# Patient Record
Sex: FEMALE | HISPANIC OR LATINO | Employment: FULL TIME | URBAN - METROPOLITAN AREA
[De-identification: names, ages, dates, MRNs, and addresses within clinical notes are randomized per-mention and may not be internally consistent; named-entity substitution may affect disease eponyms.]

---

## 2019-04-01 ENCOUNTER — OFFICE VISIT (OUTPATIENT)
Dept: CARDIOLOGY | Facility: CLINIC | Age: 59
End: 2019-04-01
Payer: COMMERCIAL

## 2019-04-01 VITALS
HEIGHT: 66 IN | WEIGHT: 209 LBS | SYSTOLIC BLOOD PRESSURE: 140 MMHG | BODY MASS INDEX: 33.59 KG/M2 | HEART RATE: 63 BPM | DIASTOLIC BLOOD PRESSURE: 87 MMHG

## 2019-04-01 DIAGNOSIS — R07.89 OTHER CHEST PAIN: ICD-10-CM

## 2019-04-01 DIAGNOSIS — R00.2 PALPITATIONS: Primary | ICD-10-CM

## 2019-04-01 PROCEDURE — 99204 PR OFFICE/OUTPT VISIT, NEW, LEVL IV, 45-59 MIN: ICD-10-PCS | Mod: S$GLB,,, | Performed by: INTERNAL MEDICINE

## 2019-04-01 PROCEDURE — 3008F BODY MASS INDEX DOCD: CPT | Mod: CPTII,S$GLB,, | Performed by: INTERNAL MEDICINE

## 2019-04-01 PROCEDURE — 99999 PR PBB SHADOW E&M-NEW PATIENT-LVL II: CPT | Mod: PBBFAC,,, | Performed by: INTERNAL MEDICINE

## 2019-04-01 PROCEDURE — 93005 EKG 12-LEAD: ICD-10-PCS | Mod: S$GLB,,, | Performed by: NUCLEAR MEDICINE

## 2019-04-01 PROCEDURE — 93010 ELECTROCARDIOGRAM REPORT: CPT | Mod: S$GLB,,, | Performed by: NUCLEAR MEDICINE

## 2019-04-01 PROCEDURE — 93010 EKG 12-LEAD: ICD-10-PCS | Mod: S$GLB,,, | Performed by: NUCLEAR MEDICINE

## 2019-04-01 PROCEDURE — 99204 OFFICE O/P NEW MOD 45 MIN: CPT | Mod: S$GLB,,, | Performed by: INTERNAL MEDICINE

## 2019-04-01 PROCEDURE — 3008F PR BODY MASS INDEX (BMI) DOCUMENTED: ICD-10-PCS | Mod: CPTII,S$GLB,, | Performed by: INTERNAL MEDICINE

## 2019-04-01 PROCEDURE — 93005 ELECTROCARDIOGRAM TRACING: CPT | Mod: S$GLB,,, | Performed by: NUCLEAR MEDICINE

## 2019-04-01 PROCEDURE — 99999 PR PBB SHADOW E&M-NEW PATIENT-LVL II: ICD-10-PCS | Mod: PBBFAC,,, | Performed by: INTERNAL MEDICINE

## 2019-04-01 RX ORDER — PRAVASTATIN SODIUM 20 MG/1
TABLET ORAL
COMMUNITY
Start: 2019-03-08 | End: 2020-05-26 | Stop reason: SDUPTHER

## 2019-04-01 RX ORDER — LOTEPREDNOL ETABONATE 5 MG/ML
SUSPENSION/ DROPS OPHTHALMIC
COMMUNITY
End: 2019-05-02

## 2019-04-01 RX ORDER — CYCLOSPORINE 0.5 MG/ML
EMULSION OPHTHALMIC
COMMUNITY
Start: 2019-03-14 | End: 2024-03-26 | Stop reason: ALTCHOICE

## 2019-04-01 NOTE — PROGRESS NOTES
Subjective:    Patient ID:  Zahida Dodd is a 59 y.o. female who presents for evaluation of new pt (new pt- CP, arm pain ) and Shortness of Breath      [id number] yo  woman who has mild HLD who a week ago had abdominal pain that eventually went up into her chest after eating. Later that night she awoke with CP radiating to arm and took several ASA that resolved the symptoms. Denies exertional symptoms.    Review of Systems   Constitution: Negative for decreased appetite, fever, malaise/fatigue, weight gain and weight loss.   HENT: Negative for hearing loss and nosebleeds.    Eyes: Negative for visual disturbance.   Cardiovascular: Positive for chest pain. Negative for claudication, cyanosis, dyspnea on exertion, irregular heartbeat, leg swelling, near-syncope, orthopnea, palpitations, paroxysmal nocturnal dyspnea and syncope.   Respiratory: Negative for cough, hemoptysis, shortness of breath, sleep disturbances due to breathing, snoring and wheezing.    Endocrine: Negative for cold intolerance, heat intolerance, polydipsia and polyuria.   Hematologic/Lymphatic: Negative for adenopathy and bleeding problem. Does not bruise/bleed easily.   Skin: Negative for color change, itching, poor wound healing, rash and suspicious lesions.   Musculoskeletal: Negative for arthritis, back pain, falls, joint pain, joint swelling, muscle cramps, muscle weakness and myalgias.   Gastrointestinal: Positive for heartburn. Negative for bloating, abdominal pain, change in bowel habit, constipation, flatus, hematemesis, hematochezia, hemorrhoids, jaundice, melena, nausea and vomiting.   Genitourinary: Negative for bladder incontinence, decreased libido, frequency, hematuria, hesitancy and urgency.   Neurological: Negative for brief paralysis, difficulty with concentration, excessive daytime sleepiness, dizziness, focal weakness, headaches, light-headedness, loss of balance, numbness, vertigo and weakness.  "  Psychiatric/Behavioral: Negative for altered mental status, depression and memory loss. The patient does not have insomnia and is not nervous/anxious.    Allergic/Immunologic: Negative for environmental allergies, hives and persistent infections.        Objective:    Physical Exam   Constitutional: She is oriented to person, place, and time. She appears well-developed and well-nourished.   BP (!) 140/87   Pulse 63   Ht 5' 6" (1.676 m)   Wt 94.8 kg (209 lb)   BMI 33.73 kg/m²      HENT:   Head: Normocephalic and atraumatic.   Right Ear: External ear normal.   Left Ear: External ear normal.   Nose: Nose normal.   Mouth/Throat: Oropharynx is clear and moist.   Eyes: Pupils are equal, round, and reactive to light. Conjunctivae, EOM and lids are normal. Right eye exhibits no discharge. Left eye exhibits no discharge. Right conjunctiva has no hemorrhage. No scleral icterus.   Neck: Normal range of motion. Neck supple. No JVD present. No tracheal deviation present. No thyromegaly present.   Cardiovascular: Normal rate, regular rhythm, normal heart sounds and intact distal pulses. Exam reveals no gallop and no friction rub.   No murmur heard.  Pulmonary/Chest: Effort normal and breath sounds normal. No respiratory distress. She has no wheezes. She has no rales. She exhibits no tenderness. Breasts are symmetrical.   Abdominal: Soft. Bowel sounds are normal. She exhibits no distension and no mass. There is no hepatosplenomegaly or hepatomegaly. There is no tenderness. There is no rebound and no guarding.   Musculoskeletal: Normal range of motion. She exhibits no edema or tenderness.   Lymphadenopathy:     She has no cervical adenopathy.   Neurological: She is alert and oriented to person, place, and time. She displays normal reflexes. No cranial nerve deficit. Coordination normal.   Skin: Skin is warm and dry. No rash noted. No erythema. No pallor.   Psychiatric: She has a normal mood and affect. Her behavior is normal. " Judgment and thought content normal.   Nursing note and vitals reviewed.        Assessment:       1. Palpitations    2. Other chest pain         Plan:    Symptoms are not typical but she is in the right age group and has HLD      Orders Placed This Encounter   Procedures    SCHEDULED EKG 12-LEAD (to Muse)    Exercise stress echo     Follow up if symptoms worsen or fail to improve, for Will call with results.

## 2019-05-02 ENCOUNTER — HOSPITAL ENCOUNTER (OUTPATIENT)
Dept: RADIOLOGY | Facility: HOSPITAL | Age: 59
Discharge: HOME | End: 2019-05-02
Attending: OBSTETRICS & GYNECOLOGY
Payer: COMMERCIAL

## 2019-05-02 ENCOUNTER — OFFICE VISIT (OUTPATIENT)
Dept: OBSTETRICS AND GYNECOLOGY | Facility: CLINIC | Age: 59
End: 2019-05-02
Payer: COMMERCIAL

## 2019-05-02 VITALS
WEIGHT: 210.75 LBS | HEIGHT: 66 IN | DIASTOLIC BLOOD PRESSURE: 84 MMHG | BODY MASS INDEX: 33.87 KG/M2 | SYSTOLIC BLOOD PRESSURE: 120 MMHG

## 2019-05-02 VITALS — HEIGHT: 66 IN | BODY MASS INDEX: 33.75 KG/M2 | WEIGHT: 210 LBS

## 2019-05-02 DIAGNOSIS — Z12.31 VISIT FOR SCREENING MAMMOGRAM: ICD-10-CM

## 2019-05-02 DIAGNOSIS — Z01.419 ENCOUNTER FOR GYNECOLOGICAL EXAMINATION WITHOUT ABNORMAL FINDING: Primary | ICD-10-CM

## 2019-05-02 DIAGNOSIS — Z90.711 S/P PARTIAL HYSTERECTOMY WITH REMAINING CERVICAL STUMP: ICD-10-CM

## 2019-05-02 DIAGNOSIS — G47.00 INSOMNIA, UNSPECIFIED TYPE: ICD-10-CM

## 2019-05-02 DIAGNOSIS — N89.8 VAGINAL DRYNESS: ICD-10-CM

## 2019-05-02 PROCEDURE — 87624 HPV HI-RISK TYP POOLED RSLT: CPT

## 2019-05-02 PROCEDURE — 99386 PR PREVENTIVE VISIT,NEW,40-64: ICD-10-PCS | Mod: S$GLB,,, | Performed by: OBSTETRICS & GYNECOLOGY

## 2019-05-02 PROCEDURE — 77067 SCR MAMMO BI INCL CAD: CPT | Mod: TC,PN

## 2019-05-02 PROCEDURE — 99999 PR PBB SHADOW E&M-EST. PATIENT-LVL III: ICD-10-PCS | Mod: PBBFAC,,, | Performed by: OBSTETRICS & GYNECOLOGY

## 2019-05-02 PROCEDURE — 99386 PREV VISIT NEW AGE 40-64: CPT | Mod: S$GLB,,, | Performed by: OBSTETRICS & GYNECOLOGY

## 2019-05-02 PROCEDURE — 77063 MAMMO DIGITAL SCREENING BILAT WITH TOMOSYNTHESIS_CAD: ICD-10-PCS | Mod: 26,,, | Performed by: RADIOLOGY

## 2019-05-02 PROCEDURE — 99999 PR PBB SHADOW E&M-EST. PATIENT-LVL III: CPT | Mod: PBBFAC,,, | Performed by: OBSTETRICS & GYNECOLOGY

## 2019-05-02 PROCEDURE — 77067 SCR MAMMO BI INCL CAD: CPT | Mod: 26,,, | Performed by: RADIOLOGY

## 2019-05-02 PROCEDURE — 77063 BREAST TOMOSYNTHESIS BI: CPT | Mod: 26,,, | Performed by: RADIOLOGY

## 2019-05-02 PROCEDURE — 88175 CYTOPATH C/V AUTO FLUID REDO: CPT

## 2019-05-02 PROCEDURE — 77067 MAMMO DIGITAL SCREENING BILAT WITH TOMOSYNTHESIS_CAD: ICD-10-PCS | Mod: 26,,, | Performed by: RADIOLOGY

## 2019-05-02 RX ORDER — PROGESTERONE 100 MG/1
100 CAPSULE ORAL NIGHTLY
Qty: 30 CAPSULE | Refills: 11 | Status: SHIPPED | OUTPATIENT
Start: 2019-05-02 | End: 2019-06-19 | Stop reason: SDUPTHER

## 2019-05-02 RX ORDER — ESTRADIOL 10 UG/1
10 INSERT VAGINAL
Qty: 8 TABLET | Refills: 11 | Status: SHIPPED | OUTPATIENT
Start: 2019-05-02 | End: 2019-06-19 | Stop reason: SDUPTHER

## 2019-05-02 NOTE — PROGRESS NOTES
Chief Complaint   Patient presents with    [facility]    Well Woman    Mammogram     History of Present Illness: Zahida Dodd is a 59 y.o. female that presents today 5/2/2019 for well gyn visit. She reports having some bleeding after intercourse 3 years ago and not having intercourse since then.     Past Medical History:   Diagnosis Date    Arthritis     Hyperlipidemia        Past Surgical History:   Procedure Laterality Date    COLONOSCOPY  2014    repeat in 10    HYSTERECTOMY  2004    cervix present, on HRT for 7 years age 42-49    OOPHORECTOMY      REDUCTION OF BOTH BREASTS         Current Outpatient Medications   Medication Sig Dispense Refill    pravastatin (PRAVACHOL) 20 MG tablet       RESTASIS 0.05 % ophthalmic emulsion        No current facility-administered medications for this visit.        Review of patient's allergies indicates:   Allergen Reactions    Penicillins Hives       Family History   Problem Relation Age of Onset    Breast cancer Neg Hx     Ovarian cancer Neg Hx        Social History     Socioeconomic History    Marital status:      Spouse name: Not on file    Number of children: Not on file    Years of education: Not on file    Highest education level: Not on file   Occupational History    Not on file   Social Needs    Financial resource strain: Not on file    Food insecurity:     Worry: Not on file     Inability: Not on file    Transportation needs:     Medical: Not on file     Non-medical: Not on file   Tobacco Use    Smoking status: Never Smoker   Substance and Sexual Activity    Alcohol use: Yes     Comment: rare    Drug use: Never    Sexual activity: Not on file   Lifestyle    Physical activity:     Days per week: Not on file     Minutes per session: Not on file    Stress: Not on file   Relationships    Social connections:     Talks on phone: Not on file     Gets together: Not on file     Attends Judaism service: Not on file     Active member of  "club or organization: Not on file     Attends meetings of clubs or organizations: Not on file     Relationship status: Not on file   Other Topics Concern    Not on file   Social History Narrative    Not on file       OB History    Para Term  AB Living   3 3 3         SAB TAB Ectopic Multiple Live Births                  # Outcome Date GA Lbr Waqas/2nd Weight Sex Delivery Anes PTL Lv   3 Term      CS-LTranv      2 Term      CS-LTranv      1 Term      CS-LTranv          Review of Symptoms:  GENERAL: Denies weight gain or weight loss. Feeling well overall.   SKIN: Denies rash or lesions.   HEAD: Denies head injury or headache.   NODES: Denies enlarged lymph nodes.   CHEST: Denies chest pain or shortness of breath.   CARDIOVASCULAR: Denies palpitations or left sided chest pain.   ABDOMEN: No abdominal pain, constipation, diarrhea, nausea, vomiting or rectal bleeding.   URINARY: No frequency, dysuria, hematuria, or burning on urination.  HEMATOLOGIC: No easy bruisability or excessive bleeding.   MUSCULOSKELETAL: Denies joint pain or swelling.     /84   Ht 5' 6" (1.676 m)   Wt 95.6 kg (210 lb 12.2 oz)   Physical Exam:  APPEARANCE: Well nourished, well developed, in no acute distress.  SKIN: Normal skin turgor, no lesions.  NECK: Neck symmetric without masses   RESPIRATORY: Normal respiratory effort with no retractions or use of accessory muscles  CARDIOVASCULAR: Peripheral vascular system with no swelling no varicosities and palpation of pulses normal  LYMPHATIC: No enlargements of the lymph nodes noted in the neck, axillae, or groin  ABDOMEN: Soft. No tenderness or masses. No hepatosplenomegaly. No hernias.  BREASTS: Symmetrical, no skin changes or visible lesions. No palpable masses, nipple discharge or adenopathy bilaterally.  PELVIC: Normal external female genitalia without lesions. Normal hair distribution. Adequate perineal body, normal urethral meatus. Urethra with no masses.  Bladder " nontender. Vagina moist and well rugated without lesions or discharge. No significant cystocele or rectocele.  Adnexa without masses or tenderness. Urethra and bladder normal. Cervix present +++  EXTREMITIES: No clubbing cyanosis or edema.    ASSESSMENT/PLAN:  Encounter for gynecological examination without abnormal finding    Visit for screening mammogram  -     Cancel: Mammo Digital Screening Bilat With CAD; Future; Expected date: 05/02/2019    S/p partial hysterectomy with remaining cervical stump          Patient was counseled today on Pap guidelines. We discussed the discontinuing the pap smear after hysterectomy except in certain high risk cases.  We discussed the need for pelvic exams.   We discussed STD screening if at high risk for an STD.  We discussed breast cancer screening with mammograms every other year after the age of 40 and annually after the age of 50.    We discussed colon cancer screening.   Osteoporosis screening with the Dexa Bone Scan discussed when indicated.   She will see her PCP for other health maintenance.       FOLLOW-UP:prn

## 2019-05-08 LAB
HPV HR 12 DNA CVX QL NAA+PROBE: NEGATIVE
HPV16 AG SPEC QL: NEGATIVE
HPV18 DNA SPEC QL NAA+PROBE: NEGATIVE

## 2019-06-05 ENCOUNTER — OFFICE VISIT (OUTPATIENT)
Dept: FAMILY MEDICINE | Facility: CLINIC | Age: 59
End: 2019-06-05
Payer: COMMERCIAL

## 2019-06-05 ENCOUNTER — HOSPITAL ENCOUNTER (OUTPATIENT)
Dept: RADIOLOGY | Facility: HOSPITAL | Age: 59
Discharge: HOME | End: 2019-06-05
Attending: FAMILY MEDICINE
Payer: COMMERCIAL

## 2019-06-05 VITALS
OXYGEN SATURATION: 95 % | HEART RATE: 63 BPM | BODY MASS INDEX: 34.19 KG/M2 | HEIGHT: 66 IN | SYSTOLIC BLOOD PRESSURE: 116 MMHG | WEIGHT: 212.75 LBS | DIASTOLIC BLOOD PRESSURE: 78 MMHG

## 2019-06-05 DIAGNOSIS — M54.2 NECK PAIN: ICD-10-CM

## 2019-06-05 DIAGNOSIS — R20.2 TINGLING: ICD-10-CM

## 2019-06-05 DIAGNOSIS — F43.9 STRESS: ICD-10-CM

## 2019-06-05 DIAGNOSIS — G89.29 CHRONIC PAIN OF RIGHT KNEE: ICD-10-CM

## 2019-06-05 DIAGNOSIS — R53.83 OTHER FATIGUE: ICD-10-CM

## 2019-06-05 DIAGNOSIS — Z11.59 NEED FOR HEPATITIS C SCREENING TEST: ICD-10-CM

## 2019-06-05 DIAGNOSIS — E78.49 OTHER HYPERLIPIDEMIA: ICD-10-CM

## 2019-06-05 DIAGNOSIS — N39.498 OTHER URINARY INCONTINENCE: ICD-10-CM

## 2019-06-05 DIAGNOSIS — E66.09 CLASS 1 OBESITY DUE TO EXCESS CALORIES WITHOUT SERIOUS COMORBIDITY WITH BODY MASS INDEX (BMI) OF 34.0 TO 34.9 IN ADULT: ICD-10-CM

## 2019-06-05 DIAGNOSIS — M54.9 UPPER BACK PAIN: ICD-10-CM

## 2019-06-05 DIAGNOSIS — M25.561 CHRONIC PAIN OF RIGHT KNEE: ICD-10-CM

## 2019-06-05 DIAGNOSIS — M54.9 UPPER BACK PAIN: Primary | ICD-10-CM

## 2019-06-05 PROBLEM — E66.811 CLASS 1 OBESITY DUE TO EXCESS CALORIES WITHOUT SERIOUS COMORBIDITY WITH BODY MASS INDEX (BMI) OF 34.0 TO 34.9 IN ADULT: Status: ACTIVE | Noted: 2019-06-05

## 2019-06-05 PROBLEM — R32 ABSENCE OF BLADDER CONTINENCE: Status: ACTIVE | Noted: 2019-06-05

## 2019-06-05 PROBLEM — M25.562 CHRONIC PAIN OF BOTH KNEES: Status: ACTIVE | Noted: 2019-06-05

## 2019-06-05 PROCEDURE — 99999 PR PBB SHADOW E&M-EST. PATIENT-LVL IV: CPT | Mod: PBBFAC,,, | Performed by: FAMILY MEDICINE

## 2019-06-05 PROCEDURE — 99204 OFFICE O/P NEW MOD 45 MIN: CPT | Mod: S$GLB,,, | Performed by: FAMILY MEDICINE

## 2019-06-05 PROCEDURE — 72040 X-RAY EXAM NECK SPINE 2-3 VW: CPT | Mod: 26,,, | Performed by: RADIOLOGY

## 2019-06-05 PROCEDURE — 72040 X-RAY EXAM NECK SPINE 2-3 VW: CPT | Mod: TC,FY,PO

## 2019-06-05 PROCEDURE — 3008F BODY MASS INDEX DOCD: CPT | Mod: CPTII,S$GLB,, | Performed by: FAMILY MEDICINE

## 2019-06-05 PROCEDURE — 99999 PR PBB SHADOW E&M-EST. PATIENT-LVL IV: ICD-10-PCS | Mod: PBBFAC,,, | Performed by: FAMILY MEDICINE

## 2019-06-05 PROCEDURE — 99204 PR OFFICE/OUTPT VISIT, NEW, LEVL IV, 45-59 MIN: ICD-10-PCS | Mod: S$GLB,,, | Performed by: FAMILY MEDICINE

## 2019-06-05 PROCEDURE — 72040 XR CERVICAL SPINE AP LATERAL: ICD-10-PCS | Mod: 26,,, | Performed by: RADIOLOGY

## 2019-06-05 PROCEDURE — 3008F PR BODY MASS INDEX (BMI) DOCUMENTED: ICD-10-PCS | Mod: CPTII,S$GLB,, | Performed by: FAMILY MEDICINE

## 2019-06-05 RX ORDER — MELOXICAM 15 MG/1
15 TABLET ORAL DAILY
COMMUNITY
End: 2019-12-09

## 2019-06-05 NOTE — PATIENT INSTRUCTIONS

## 2019-06-10 ENCOUNTER — TELEPHONE (OUTPATIENT)
Dept: FAMILY MEDICINE | Facility: CLINIC | Age: 59
End: 2019-06-10

## 2019-06-10 NOTE — TELEPHONE ENCOUNTER
"I sent a message to [employer]:    "The results of the test showed that the B12 levels are slightly elevated, if you taking vitamin B12 or B complex or multivitamins that contained b12, you can start taking it every other day instead of daily.  Cholesterol levels are slightly elevated, decrease fried foods, red meat and processed starches, eat more vegetables, more fruits, more salads, drink plenty water.  Uric acid levels are in the limit, but because is under 6, we do not have to treat it.  Thyroid function and blood count is good.  If you have any questions or concerns please do not hesitate to contact us.  Thank you".    X rays:  "The results of the test showed presence of arthritis on the neck area, because of the numbness sensation and to rule out any nerve problems, I will order a test to check for nerve damage, the test is call EMG studies, electromyography studies, the nurse will call you with appointment.  With that will make sure that is not any pinched nerve.  If you have any questions or concerns please do not hesitate to contact us.  Thank you"    Thank you       "

## 2019-06-10 NOTE — TELEPHONE ENCOUNTER
----- Message from Luba Burgos sent at 6/10/2019  2:23 PM CDT -----  Contact: patient  Type:  Test Results    Who Called:  patient  Name of Test (Lab/Mammo/Etc):  Lab and xray  Date of Test:  0605/2019  Ordering Provider:  Jessica  Where the test was performed:  [facility]  Best Call Back Number:  [phone]  Additional Information:

## 2019-06-11 ENCOUNTER — TELEPHONE (OUTPATIENT)
Dept: FAMILY MEDICINE | Facility: CLINIC | Age: 59
End: 2019-06-11

## 2019-06-11 NOTE — TELEPHONE ENCOUNTER
----- Message from Catherine Sanchez sent at 6/11/2019  2:46 PM CDT -----  Contact: Patient  Type: Needs Medical Advice    Who Called:  patient  Symptoms (please be specific):  na  How long has patient had these symptoms:  wendy  Pharmacy name and phone #:  na  Best Call Back Number: Mornings from 7am to 10am /[phone]  Additional Information: Patient is looking for lab test results and results for X-Rays preformed 6/5.  Please call to advise. Thanks!

## 2019-06-12 ENCOUNTER — TELEPHONE (OUTPATIENT)
Dept: FAMILY MEDICINE | Facility: CLINIC | Age: 59
End: 2019-06-12

## 2019-06-12 NOTE — TELEPHONE ENCOUNTER
----- Message from Kunal Vargas sent at 6/12/2019  9:54 AM CDT -----  Type: Needs Medical Advice    Who Called:  Patient    Best Call Back Number: [phone]  Please call between 2 and 3  Additional Information: Patient states that she would like a callback regarding her blood work and X ray results.

## 2019-06-12 NOTE — TELEPHONE ENCOUNTER
----- Message from Zeina Rose sent at 6/12/2019  2:45 PM CDT -----    Type:  Test Results    Who Called:   pt  Name of Test (Lab/Mammo/Etc):  Lab  And  xray  Best Call Back Number:  [phone]  Additional Information:   Please  Leave a  Voice mail // pt  Asking  For results  If  Possible  Please  Put in my  [facility]  Chart for  Pt to  View   Today let pt   Know  On  Voice mail

## 2019-06-13 NOTE — PROGRESS NOTES
Subjective:       Patient ID: Zahida Dodd is a 59 y.o. female.    Chief Complaint: Establish Care; Stress (New Job); Tingling (Upper Left Side of Back and Left Hand); and Urinary Incontinence    The patient is coming here today to establish a new primary care physician, also complains of urinary incontinence.  Denies any dysuria or burning sensation of urination.    Neck Pain    This is a chronic problem. The current episode started more than 1 month ago. The problem occurs intermittently. The problem has been waxing and waning. The pain is associated with nothing. The pain is present in the left side. The quality of the pain is described as aching. The pain is moderate. The symptoms are aggravated by position and twisting. The pain is worse during the night. Associated symptoms include numbness and tingling. Pertinent negatives include no chest pain, fever, headaches, leg pain, pain with swallowing, paresis, photophobia, trouble swallowing, visual change or weakness. She has tried acetaminophen and NSAIDs for the symptoms. The treatment provided mild relief.   Knee Pain    There was no injury mechanism. The pain is present in the right knee. The quality of the pain is described as aching. The pain is moderate. The pain has been worsening since onset. Associated symptoms include an inability to bear weight, numbness and tingling. Pertinent negatives include no loss of motion or loss of sensation. She reports no foreign bodies present. The symptoms are aggravated by movement, palpation and weight bearing. She has tried acetaminophen and NSAIDs for the symptoms. The treatment provided mild relief.   Fatigue   This is a chronic problem. The current episode started more than 1 month ago. The problem occurs intermittently. The problem has been gradually worsening. Associated symptoms include arthralgias, fatigue, neck pain and numbness. Pertinent negatives include no abdominal pain, chest pain, congestion, coughing,  fever, headaches, joint swelling, myalgias, sore throat, swollen glands, urinary symptoms, visual change or weakness. Nothing aggravates the symptoms. She has tried nothing for the symptoms. The treatment provided no relief.   Hyperlipidemia   This is a chronic problem. The current episode started more than 1 year ago. The problem is uncontrolled. Recent lipid tests were reviewed and are high. Exacerbating diseases include obesity. She has no history of chronic renal disease or hypothyroidism. Factors aggravating her hyperlipidemia include fatty foods. Pertinent negatives include no chest pain, leg pain or myalgias. She is currently on no antihyperlipidemic treatment. The current treatment provides no improvement of lipids. Compliance problems include adherence to diet and adherence to exercise.  Risk factors for coronary artery disease include dyslipidemia, obesity and post-menopausal.      Past medical history, past social history, past Family history, past surgical history was reviewed and discussed with the patient.    Review of Systems   Constitutional: Positive for fatigue. Negative for activity change, appetite change and fever.   HENT: Negative for congestion, dental problem, sore throat and trouble swallowing.    Eyes: Negative for photophobia, discharge and itching.   Respiratory: Negative for apnea, cough and chest tightness.    Cardiovascular: Negative for chest pain and leg swelling.   Gastrointestinal: Negative for abdominal distention and abdominal pain.   Endocrine: Negative for cold intolerance and heat intolerance.   Genitourinary: Negative for dysuria and vaginal bleeding.   Musculoskeletal: Positive for arthralgias, neck pain and neck stiffness. Negative for joint swelling and myalgias.   Skin: Negative for color change and pallor.   Allergic/Immunologic: Negative for environmental allergies and food allergies.   Neurological: Positive for tingling and numbness. Negative for dizziness, facial  asymmetry, weakness and headaches.   Hematological: Negative for adenopathy. Does not bruise/bleed easily.   Psychiatric/Behavioral: Negative for agitation and behavioral problems.       Objective:      Physical Exam   Constitutional: She appears well-developed and well-nourished. No distress.   HENT:   Head: Normocephalic and atraumatic.   Right Ear: External ear normal.   Left Ear: External ear normal.   Nose: Nose normal.   Eyes: Pupils are equal, round, and reactive to light. Conjunctivae are normal. Right eye exhibits no discharge. Left eye exhibits no discharge.   Neck: No thyromegaly present.   Cardiovascular: Normal rate, regular rhythm and normal heart sounds.   No murmur heard.  Pulmonary/Chest: Effort normal and breath sounds normal. No respiratory distress. She has no wheezes.   Abdominal: She exhibits no distension. There is no tenderness.   Musculoskeletal: She exhibits tenderness (Upper back and neck with decreased range of motion). She exhibits no deformity.   Right knee with decreased range of motion and tenderness to palpation   Neurological: No cranial nerve deficit. Coordination normal.   Skin: She is not diaphoretic. No erythema. No pallor.   Psychiatric: She has a normal mood and affect. Her behavior is normal. Judgment and thought content normal.   Nursing note and vitals reviewed.      Assessment:       1. Upper back pain    2. Tingling    3. Stress    4. Other urinary incontinence    5. Class 1 obesity due to excess calories without serious comorbidity with body mass index (BMI) of 34.0 to 34.9 in adult    6. Other hyperlipidemia    7. Other fatigue    8. Need for hepatitis C screening test    9. Chronic pain of right knee    10. Neck pain        Plan:       Upper back pain:  New problem, workup needed  -     X-Ray Cervical Spine AP And Lateral; Future; Expected date: 06/05/2019  -     EMG W/ ULTRASOUND AND NERVE CONDUCTION TEST 1 Extremity; Future    Tingling:  Worsening  -     Vitamin B12;  Future; Expected date: 06/05/2019  -     Folate; Future; Expected date: 06/05/2019  -     X-Ray Cervical Spine AP And Lateral; Future; Expected date: 06/05/2019  -     EMG W/ ULTRASOUND AND NERVE CONDUCTION TEST 1 Extremity; Future    Stress:  Worsening    Other urinary incontinence:  Worsening    Class 1 obesity due to excess calories without serious comorbidity with body mass index (BMI) of 34.0 to 34.9 in adult:  Worsening    Other hyperlipidemia:  Uncontrolled  -     Lipid panel; Future; Expected date: 06/05/2019    Other fatigue:  Worsening  -     CBC auto differential; Future; Expected date: 06/05/2019  -     Comprehensive metabolic panel; Future; Expected date: 06/05/2019  -     TSH; Future; Expected date: 06/05/2019    Need for hepatitis C screening test  -     Hepatitis C antibody; Future; Expected date: 06/05/2019    Chronic pain of right knee:  Worsening  -     Uric acid; Future; Expected date: 06/05/2019  -     Ambulatory consult to Physical Medicine Rehab    Neck pain:  Worsening  -     X-Ray Cervical Spine AP And Lateral; Future; Expected date: 06/05/2019  -     EMG W/ ULTRASOUND AND NERVE CONDUCTION TEST 1 Extremity; Future    Will call the patient after we have the results of the test, decrease fried foods, red meat and processed starches, drink plenty water, weight loss, increase physical activity as tolerated.The patient's BMI has been recorded in the chart. The patient has been provided educational materials regarding the benefits of attaining and maintaining a normal weight. We will continue to address and follow this issue during follow up visits.Patient agreed with assessment and plan. Patient verbalized understanding.

## 2019-06-14 ENCOUNTER — TELEPHONE (OUTPATIENT)
Dept: FAMILY MEDICINE | Facility: CLINIC | Age: 59
End: 2019-06-14

## 2019-06-14 DIAGNOSIS — Z12.11 COLON CANCER SCREENING: ICD-10-CM

## 2019-06-14 NOTE — TELEPHONE ENCOUNTER
----- Message from Bobbi Ash sent at 6/14/2019  9:42 AM CDT -----  Contact: Patient  Type:  Test Results    Who Called:  Patient  Name of Test (Lab/Mammo/Etc):  Labs/xray  Date of Test:  6/5/19  Ordering Provider:  Dr. Lopez  Where the test was performed:  [facility]  Best Call Back Number:  [phone] (home)    Additional Information:  States that she has left several messages and states that she needs to know these results. Requesting to have mailed, states that she is no longer calling back. Call placed to pod. Thank you!

## 2019-06-19 ENCOUNTER — INITIAL CONSULT (OUTPATIENT)
Dept: PHYSICAL MEDICINE AND REHAB | Facility: CLINIC | Age: 59
End: 2019-06-19
Payer: COMMERCIAL

## 2019-06-19 VITALS
BODY MASS INDEX: 34.07 KG/M2 | SYSTOLIC BLOOD PRESSURE: 141 MMHG | WEIGHT: 212 LBS | DIASTOLIC BLOOD PRESSURE: 59 MMHG | HEIGHT: 66 IN | HEART RATE: 75 BPM

## 2019-06-19 DIAGNOSIS — M22.41 CHONDROMALACIA OF BOTH PATELLAE: ICD-10-CM

## 2019-06-19 DIAGNOSIS — M25.561 CHRONIC PAIN OF BOTH KNEES: Primary | ICD-10-CM

## 2019-06-19 DIAGNOSIS — N89.8 VAGINAL DRYNESS: ICD-10-CM

## 2019-06-19 DIAGNOSIS — G47.00 INSOMNIA, UNSPECIFIED TYPE: ICD-10-CM

## 2019-06-19 DIAGNOSIS — G89.29 CHRONIC PAIN OF BOTH KNEES: Primary | ICD-10-CM

## 2019-06-19 DIAGNOSIS — M25.562 CHRONIC PAIN OF BOTH KNEES: Primary | ICD-10-CM

## 2019-06-19 DIAGNOSIS — M22.42 CHONDROMALACIA OF BOTH PATELLAE: ICD-10-CM

## 2019-06-19 DIAGNOSIS — M17.0 PRIMARY OSTEOARTHRITIS OF BOTH KNEES: Primary | ICD-10-CM

## 2019-06-19 PROCEDURE — 99999 PR PBB SHADOW E&M-EST. PATIENT-LVL III: CPT | Mod: PBBFAC,,, | Performed by: PHYSICAL MEDICINE & REHABILITATION

## 2019-06-19 PROCEDURE — 99204 OFFICE O/P NEW MOD 45 MIN: CPT | Mod: S$GLB,,, | Performed by: PHYSICAL MEDICINE & REHABILITATION

## 2019-06-19 PROCEDURE — 99204 PR OFFICE/OUTPT VISIT, NEW, LEVL IV, 45-59 MIN: ICD-10-PCS | Mod: S$GLB,,, | Performed by: PHYSICAL MEDICINE & REHABILITATION

## 2019-06-19 PROCEDURE — 99999 PR PBB SHADOW E&M-EST. PATIENT-LVL III: ICD-10-PCS | Mod: PBBFAC,,, | Performed by: PHYSICAL MEDICINE & REHABILITATION

## 2019-06-19 NOTE — PROGRESS NOTES
[facility]    Consulting Provider: Dr. Mary Lopez    CHIEF COMPLAINT:   Chief Complaint   Patient presents with    Knee Pain     HISTORY OF PRESENT ILLNESS: Zahida Dodd is a 59 y.o. female w/PMHX of HPLD who presents to me for the first time for evaluation of right knee pain. Pt recently seen by PCP, Dr. Mary Lopez, who ordered uric acid to r/o gout and did a neuropathy lab workup that both came back within normal limits.     She reports that she has pain in both knees (R>L). She has had this pain for a number of years. She reports that she had an MRI performed two years ago that revealed meniscal tears. She was seen by an orthopedist in [place], Dr. Izzy Shankar, who recommended arthroscopic surgery for both knees that she declined.  She has also been told that she has arthritis.     She reports that she previously had a job in which she was standing all day, but she switched to a sedentary job and subsequently gained 15 pounds. It Is aggravated by prolonged standing. She denies any initial injury, but she does report she ran a lot when she was younger. She does report intermittent swelling of the right knee but no swelling of the left. She also endorses swelling of her ankles and pain in her feet. Denies locking of her knees.     She has not had any injections in her knees or elsewhere. She was prescribed meloxicam last Wednesday, which she took for three days and then stopped as she felt she no longer needed it. She says that her pain is currently pretty well controlled. It is much better than it was 2 years ago.     Treatments/therapies:  Meloxicam PRN - mild relief; takes infrequently  Formal physical therapy with transition to HEP - Does HEP 3x per week, mild relief  Stationary bike - mild relief    Review of Systems   Constitutional: Negative for fever.   HENT: Negative for drooling.    Eyes: Negative for discharge.   Respiratory: Negative for choking.    Cardiovascular: Negative  for chest pain.   Genitourinary: Negative for flank pain.   Skin: Negative for wound.   Allergic/Immunologic: Negative for immunocompromised state.   Neurological: Negative for tremors and syncope.   Psychiatric/Behavioral: Negative for behavioral problems.     Past Medical History:   Past Medical History:   Diagnosis Date    Arthritis     Hyperlipidemia        Past Surgical History:   Past Surgical History:   Procedure Laterality Date    COLONOSCOPY  2014    repeat in 10    HYSTERECTOMY  2004    cervix present, on HRT for 7 years age 44-49    OOPHORECTOMY      REDUCTION OF BOTH BREASTS      TOTAL REDUCTION MAMMOPLASTY         Family History:   Family History   Problem Relation Age of Onset    Heart disease Mother     Dementia Mother     Breast cancer Neg Hx     Ovarian cancer Neg Hx        Medications:   Current Outpatient Medications on File Prior to Visit   Medication Sig Dispense Refill    estradiol (VAGIFEM) 10 mcg Tab Place 1 tablet (10 mcg total) vaginally every Monday and Thursday. 8 tablet 11    meloxicam (MOBIC) 15 MG tablet Take 15 mg by mouth once daily.      pravastatin (PRAVACHOL) 20 MG tablet       progesterone (PROMETRIUM) 100 MG capsule Take 1 capsule (100 mg total) by mouth nightly. 30 capsule 11    RESTASIS 0.05 % ophthalmic emulsion        No current facility-administered medications on file prior to visit.        Allergies:   Review of patient's allergies indicates:   Allergen Reactions    Penicillins Hives       Social History:   Social History     Socioeconomic History    Marital status:      Spouse name: Not on file    Number of children: Not on file    Years of education: Not on file    Highest education level: Not on file   Occupational History    Not on file   Social Needs    Financial resource strain: Not on file    Food insecurity:     Worry: Not on file     Inability: Not on file    Transportation needs:     Medical: Not on file     Non-medical: Not on  "file   Tobacco Use    Smoking status: Never Smoker   Substance and Sexual Activity    Alcohol use: Yes     Comment: rare    Drug use: Never    Sexual activity: Not Currently   Lifestyle    Physical activity:     Days per week: Not on file     Minutes per session: Not on file    Stress: Very much   Relationships    Social connections:     Talks on phone: Not on file     Gets together: Not on file     Attends [employer] service: Not on file     Active member of club or organization: Not on file     Attends meetings of clubs or organizations: Not on file     Relationship status: Not on file   Other Topics Concern    Not on file   Social History Narrative    Not on file     PHYSICAL EXAMINATION:   General    Vitals:    06/19/19 0812   BP: (!) 141/59   Pulse: 75   Weight: 96.2 kg (212 lb)   Height: 5' 6" (1.676 m)     Constitutional: Oriented to person, place, and time. No apparent distress. Pleasant.  HENT:   Head: Normocephalic and atraumatic.   Eyes: Right eye exhibits no discharge. Left eye exhibits no discharge. No scleral icterus.   Pulmonary/Chest: Effort normal. No respiratory distress.   Abdominal: There is no guarding.   Neurological: Alert and oriented to person, place, and time.   Psychiatric: Behavior is normal.   Right Knee Exam     Tenderness   The patient is experiencing tenderness in the medial joint line.    Range of Motion   Extension: 0   Right knee flexion: 125 (pain with terminal flexion)     Tests   Bro:  Medial - negative Lateral - negative  Varus: negative Valgus: negative  Lachman:  Anterior - negative        Other   Erythema: absent  Scars: absent  Swelling: none    Comments:  Tenderness over medial retropatellar facet  + patellar grind  Negative bounce home      Left Knee Exam     Tenderness   The patient is experiencing tenderness in the medial joint line.    Range of Motion   Extension: 0   Flexion: 130     Tests   Bro:  Medial - negative Lateral - negative  Varus: negative " "Valgus: negative  Lachman:  Anterior - negative        Other   Erythema: absent  Scars: absent  Swelling: none    Comments:  + patellar grind  Negative bounce home        INSPECTION: There is no swelling, ecchymoses, erythema or gross deformity of the knees.  GAIT/DYNAMIC: Normal gait.    Imaging  Plain films and MRI of knees unavailable at this time.    ASSESSMENT:   1. Chondromalacia of both patellae      PLAN:     1. Time was spent reviewing the above diagnosis in depth with Zahida today, including acute management and rehabilitation.     2. We discussed multiple treatment options with Zahida today.  We did not have any of her prior imaging available to view today, however she reports that she was told by her orthopedic surgeon that she has arthritis as well as meniscal tearing from reviewing her x-ray and MRIs. We agreed with Zahida that surgery was an overly aggressive treatment option for her knee pain considering she lacks mechanical symptoms, knee effusions, and overall she is reporting improvement over recent weeks.  I recommended that we would like to first start with conservative management. We discussed the role of corticosteroid injections, gel injections, and PRP and stem cell in treating knee pathology. We also discussed the role of exercise, particularly with low-impact aerobic exercise, VMO and core strengthening, to bring the knee into more favorable alignment and provide a better base of support. We also emphasized weight reduction in order to generate less stress on the knee.     3. After further discussion, we decided to perform Synvisc-One injections under ultrasound guidance of the bilateral knees pending insurance approval. We will have her return in 2 weeks for this injection. I have provided her with my contact information, and she may call for any questions/concerns should they arise in the interim.     This is a consult from Dr. Mary Lopez. Please see the "Communications" section of Epic " "to see how the consulting physician received the report of today's findings and recommendations. If it's an [facility] physician, it will be forwarded to his/her "in basket".    The above note was completed, in part, with the aid of Dragon dictation software/hardware. Translation errors may be present.  "

## 2019-06-19 NOTE — LETTER
June 19, 2019      Mary Lopez MD  [address] [facility] [address] [place] [address]           [place]  [address] [facility] [address] [place] [address]  Phone: [phone]  Fax: [phone]          Patient: Zahida Dodd   MR Number: [id number]   YOB: 1960   Date of Visit: 6/19/2019       Dear Dr. Mary Lopez:    Thank you for referring Zahida Dodd to me for evaluation. Attached you will find relevant portions of my assessment and plan of care.    If you have questions, please do not hesitate to call me. I look forward to following Zahida Dodd along with you.    Sincerely,    Brent Coronel MD    Enclosure  CC:  No Recipients    If you would like to receive this communication electronically, please contact [email or web address] or [phone] to request more information on [employer] Link access.    For providers and/or their staff who would like to refer a patient to [facility], please contact us through our one-stop-shop provider referral line, [facility], at [phone].    If you feel you have received this communication in error or would no longer like to receive these types of communications, please e-mail [email or web address]

## 2019-06-20 RX ORDER — PROGESTERONE 100 MG/1
100 CAPSULE ORAL NIGHTLY
Qty: 90 CAPSULE | Refills: 3 | Status: SHIPPED | OUTPATIENT
Start: 2019-06-20 | End: 2021-02-08

## 2019-06-20 RX ORDER — ESTRADIOL 10 UG/1
10 INSERT VAGINAL
Qty: 24 TABLET | Refills: 3 | Status: SHIPPED | OUTPATIENT
Start: 2019-06-20 | End: 2021-02-08

## 2019-08-22 ENCOUNTER — PATIENT OUTREACH (OUTPATIENT)
Dept: ADMINISTRATIVE | Facility: HOSPITAL | Age: 59
End: 2019-08-22

## 2019-09-05 ENCOUNTER — HOSPITAL ENCOUNTER (OUTPATIENT)
Dept: RADIOLOGY | Facility: HOSPITAL | Age: 59
Discharge: HOME | End: 2019-09-05
Attending: FAMILY MEDICINE
Payer: COMMERCIAL

## 2019-09-05 ENCOUNTER — OFFICE VISIT (OUTPATIENT)
Dept: FAMILY MEDICINE | Facility: CLINIC | Age: 59
End: 2019-09-05
Payer: COMMERCIAL

## 2019-09-05 VITALS
SYSTOLIC BLOOD PRESSURE: 118 MMHG | OXYGEN SATURATION: 98 % | BODY MASS INDEX: 34.55 KG/M2 | WEIGHT: 214.94 LBS | DIASTOLIC BLOOD PRESSURE: 82 MMHG | TEMPERATURE: 98 F | HEART RATE: 70 BPM | HEIGHT: 66 IN

## 2019-09-05 DIAGNOSIS — G89.29 CHRONIC RIGHT SHOULDER PAIN: ICD-10-CM

## 2019-09-05 DIAGNOSIS — E78.49 OTHER HYPERLIPIDEMIA: ICD-10-CM

## 2019-09-05 DIAGNOSIS — G89.29 CHRONIC RIGHT SHOULDER PAIN: Primary | ICD-10-CM

## 2019-09-05 DIAGNOSIS — M25.511 CHRONIC RIGHT SHOULDER PAIN: ICD-10-CM

## 2019-09-05 DIAGNOSIS — M25.511 CHRONIC RIGHT SHOULDER PAIN: Primary | ICD-10-CM

## 2019-09-05 PROCEDURE — 3008F BODY MASS INDEX DOCD: CPT | Mod: CPTII,S$GLB,, | Performed by: FAMILY MEDICINE

## 2019-09-05 PROCEDURE — 99999 PR PBB SHADOW E&M-EST. PATIENT-LVL IV: CPT | Mod: PBBFAC,,, | Performed by: FAMILY MEDICINE

## 2019-09-05 PROCEDURE — 99999 PR PBB SHADOW E&M-EST. PATIENT-LVL IV: ICD-10-PCS | Mod: PBBFAC,,, | Performed by: FAMILY MEDICINE

## 2019-09-05 PROCEDURE — 99213 PR OFFICE/OUTPT VISIT, EST, LEVL III, 20-29 MIN: ICD-10-PCS | Mod: S$GLB,,, | Performed by: FAMILY MEDICINE

## 2019-09-05 PROCEDURE — 99213 OFFICE O/P EST LOW 20 MIN: CPT | Mod: S$GLB,,, | Performed by: FAMILY MEDICINE

## 2019-09-05 PROCEDURE — 73030 XR SHOULDER COMPLETE 2 OR MORE VIEWS RIGHT: ICD-10-PCS | Mod: 26,RT,, | Performed by: RADIOLOGY

## 2019-09-05 PROCEDURE — 3008F PR BODY MASS INDEX (BMI) DOCUMENTED: ICD-10-PCS | Mod: CPTII,S$GLB,, | Performed by: FAMILY MEDICINE

## 2019-09-05 PROCEDURE — 73030 X-RAY EXAM OF SHOULDER: CPT | Mod: 26,RT,, | Performed by: RADIOLOGY

## 2019-09-05 PROCEDURE — 73030 X-RAY EXAM OF SHOULDER: CPT | Mod: TC,FY,PO,RT

## 2019-09-05 NOTE — PROGRESS NOTES
Subjective:       Patient ID: Zahida Dodd is a 59 y.o. female.    Chief Complaint: Follow-up (3mth fu )    Shoulder Pain    Pain location: Right shoulder. This is a new problem. The current episode started more than 1 month ago. The problem occurs constantly. The problem has been gradually worsening. The quality of the pain is described as aching. The pain is moderate. Associated symptoms include an inability to bear weight, a limited range of motion and stiffness. Pertinent negatives include no fever, headaches, itching, joint locking, joint swelling, numbness or tingling. The symptoms are aggravated by activity and lying down. She has tried acetaminophen and NSAIDS for the symptoms. The treatment provided mild relief. There is no history of diabetes, Injuries to Extremity or migraines.     Upon review of the last blood work, the patient has increase cholesterol levels and also mild increase of the uric acid levels.    Past medical history, past social history was reviewed and discussed with the patient.    Review of Systems   Constitutional: Negative for activity change, appetite change, chills and fever.   HENT: Negative for congestion and ear discharge.    Eyes: Negative for discharge and itching.   Respiratory: Negative for choking and chest tightness.    Cardiovascular: Negative for chest pain and leg swelling.   Gastrointestinal: Negative for abdominal distention and abdominal pain.   Endocrine: Negative for cold intolerance and heat intolerance.   Genitourinary: Negative for dysuria and flank pain.   Musculoskeletal: Positive for arthralgias (Right shoulder pain), neck pain and stiffness. Negative for back pain.   Skin: Negative for itching, pallor and rash.   Allergic/Immunologic: Negative for environmental allergies and food allergies.   Neurological: Negative for dizziness, tingling, facial asymmetry, numbness and headaches.   Hematological: Negative for adenopathy. Does not bruise/bleed easily.    Psychiatric/Behavioral: Negative for agitation, confusion and sleep disturbance.       Objective:      Physical Exam   Constitutional: She appears well-developed and well-nourished. No distress.   HENT:   Head: Normocephalic and atraumatic.   Right Ear: External ear normal.   Left Ear: External ear normal.   Mouth/Throat: No oropharyngeal exudate.   Eyes: Pupils are equal, round, and reactive to light. Conjunctivae are normal. Right eye exhibits no discharge. Left eye exhibits no discharge.   Neck: Neck supple.   Cardiovascular: Normal rate, regular rhythm and normal heart sounds.   No murmur heard.  Pulmonary/Chest: Effort normal and breath sounds normal. No respiratory distress. She has no wheezes.   Musculoskeletal: She exhibits tenderness (Right shoulder with decreased range of motion and tenderness to palpation). She exhibits no deformity.   Neurological: No cranial nerve deficit. Coordination normal.   Skin: Capillary refill takes less than 2 seconds. No rash noted. She is not diaphoretic. No erythema. No pallor.   Psychiatric: She has a normal mood and affect. Her behavior is normal. Judgment and thought content normal.   Nursing note and vitals reviewed.      Assessment:       1. Chronic right shoulder pain    2. Other hyperlipidemia        Plan:       Chronic right shoulder pain:  New problem, workup needed  -     X-ray Shoulder 2 or More Views Right; Future; Expected date: 09/05/2019  -     Ambulatory Referral to Physical/Occupational Therapy  -     Ambulatory referral to Physical Medicine Rehab    Other hyperlipidemia:  Uncontrolled    Will call the patient after we have the results of the test, will refer the patient to physical therapy for the right shoulder, will refer the patient to see the physical medicine specialist.  Recommend healthy habits, weight fried foods, red meat and processed starches.  Patient agreed with assessment and plan. Patient verbalized understanding.

## 2019-09-05 NOTE — PATIENT INSTRUCTIONS
Eating Heart-Healthy Food: Using the DASH Plan    Eating for your heart doesnt have to be hard or boring. You just need to know how to make healthier choices. The DASH eating plan has been developed to help you do just that. DASH stands for Dietary Approaches to Stop Hypertension. It is a plan that has been proven to be healthier for your heart and to lower your risk for high blood pressure. It can also help lower your risk for cancer, heart disease, osteoporosis, and diabetes.  Choosing from each food group  Choose foods from each of the food groups below each day. Try to get the recommended number of servings for each food group. The serving numbers are based on a diet of 2,000 calories a day. Talk to your doctor if youre unsure about your calorie needs. Along with getting the correct servings, the DASH plan also recommends a sodium intake less than 2,300 mg per day.        Grains  Servings: 6 to 8 a day  A serving is:  · 1 slice bread  · 1 ounce dry cereal  · Half a cup cooked rice, pasta or cereal  Best choices: Whole grains and any grains high in fiber. Vegetables  Servings: 4 to 5 a day  A serving is:  · 1 cup raw leafy vegetable  · Half a cup cut-up raw or cooked vegetable  · Half a cup vegetable juice  Best choices: Fresh or frozen vegetables prepared without added salt or fat.   Fruits  Servings: 4 to 5 a day  A serving is:  · 1 medium fruit  · One-quarter cup dried fruit  · Half a cup fresh, frozen, or canned fruit  · Half a cup of 100% fruit juices  Best choices: A variety of fresh fruits of different colors. Whole fruits are a better choice than fruit juices. Low-fat or fat-free dairy  Servings: 2 to 3 a day  A serving is:  · 1 cup milk  · 1 cup yogurt  · One and a half ounces cheese  Best choices: Skim or 1% milk, low-fat or fat-free yogurt or buttermilk, and low-fat cheeses.         Lean meats, poultry, fish  Servings: 6 or fewer a day  A serving is:  · 1 ounce cooked meats, poultry, or fish  · 1  egg  Best choices: Lean poultry and fish. Trim away visible fat. Broil, grill, roast, or boil instead of frying. Remove skin from poultry before eating. Limit how much red meat you eat.  Nuts, seeds, beans  Servings: 4 to 5 a week  A serving is:  · One-third cup nuts (one and a half ounces)  · 2 tablespoons nut butter or seeds  · Half a cup cooked dry beans or legumes  Best choices: Dry roasted nuts with no salt added, lentils, kidney beans, garbanzo beans, and whole schafer beans.   Fats and oils  Servings: 2 to 3 a day  A serving is:  · 1 teaspoon vegetable oil  · 1 teaspoon soft margarine  · 1 tablespoon mayonnaise  · 2 tablespoons salad dressing  Best choices: Nut and vegetable oils (nontropical vegetable oils), such as olive and canola oil. Sweets  Servings: 5 a week or fewer  A serving is:  · 1 tablespoon sugar, maple syrup, or honey  · 1 tablespoon jam or jelly  · 1 half-ounce jelly beans (about 15)  · 1 cup lemonade  Best choices: Dried fruit can be a satisfying sweet. Choose low-fat sweets. And watch your serving sizes!      For more on the DASH eating plan, visit:  www.nhlbi.nih.gov/health/health-topics/topics/dash   Date Last Reviewed: 6/1/2016  © 3643-7853 [employer]. [address] [place] [address], [place] [address]. All rights reserved. This information is not intended as a substitute for professional medical care. Always follow your healthcare professional's instructions.

## 2019-10-01 ENCOUNTER — INITIAL CONSULT (OUTPATIENT)
Dept: PHYSICAL MEDICINE AND REHAB | Facility: CLINIC | Age: 59
End: 2019-10-01
Payer: COMMERCIAL

## 2019-10-01 VITALS
HEIGHT: 66 IN | DIASTOLIC BLOOD PRESSURE: 94 MMHG | WEIGHT: 214 LBS | SYSTOLIC BLOOD PRESSURE: 141 MMHG | BODY MASS INDEX: 34.39 KG/M2 | HEART RATE: 65 BPM

## 2019-10-01 DIAGNOSIS — M67.911 TENDINOPATHY OF RIGHT ROTATOR CUFF: Primary | ICD-10-CM

## 2019-10-01 DIAGNOSIS — M67.911 TENDINOPATHY OF ROTATOR CUFF, RIGHT: Primary | ICD-10-CM

## 2019-10-01 PROCEDURE — 99999 PR PBB SHADOW E&M-EST. PATIENT-LVL III: ICD-10-PCS | Mod: PBBFAC,,, | Performed by: PHYSICAL MEDICINE & REHABILITATION

## 2019-10-01 PROCEDURE — 20611 LARGE JOINT ASPIRATION/INJECTION: R SUBACROMIAL BURSA: ICD-10-PCS | Mod: RT,S$GLB,, | Performed by: PHYSICAL MEDICINE & REHABILITATION

## 2019-10-01 PROCEDURE — 99213 PR OFFICE/OUTPT VISIT, EST, LEVL III, 20-29 MIN: ICD-10-PCS | Mod: 25,S$GLB,, | Performed by: PHYSICAL MEDICINE & REHABILITATION

## 2019-10-01 PROCEDURE — 3008F BODY MASS INDEX DOCD: CPT | Mod: CPTII,S$GLB,, | Performed by: PHYSICAL MEDICINE & REHABILITATION

## 2019-10-01 PROCEDURE — 99213 OFFICE O/P EST LOW 20 MIN: CPT | Mod: 25,S$GLB,, | Performed by: PHYSICAL MEDICINE & REHABILITATION

## 2019-10-01 PROCEDURE — 20611 DRAIN/INJ JOINT/BURSA W/US: CPT | Mod: RT,S$GLB,, | Performed by: PHYSICAL MEDICINE & REHABILITATION

## 2019-10-01 PROCEDURE — 99999 PR PBB SHADOW E&M-EST. PATIENT-LVL III: CPT | Mod: PBBFAC,,, | Performed by: PHYSICAL MEDICINE & REHABILITATION

## 2019-10-01 PROCEDURE — 3008F PR BODY MASS INDEX (BMI) DOCUMENTED: ICD-10-PCS | Mod: CPTII,S$GLB,, | Performed by: PHYSICAL MEDICINE & REHABILITATION

## 2019-10-01 RX ORDER — BETAMETHASONE SODIUM PHOSPHATE AND BETAMETHASONE ACETATE 3; 3 MG/ML; MG/ML
6 INJECTION, SUSPENSION INTRA-ARTICULAR; INTRALESIONAL; INTRAMUSCULAR; SOFT TISSUE
Status: DISCONTINUED | OUTPATIENT
Start: 2019-10-01 | End: 2019-10-01 | Stop reason: HOSPADM

## 2019-10-01 RX ORDER — MELOXICAM 15 MG/1
TABLET ORAL
COMMUNITY
End: 2019-12-09

## 2019-10-01 RX ORDER — LOTEPREDNOL ETABONATE 5 MG/ML
SUSPENSION/ DROPS OPHTHALMIC
COMMUNITY
End: 2024-03-26 | Stop reason: ALTCHOICE

## 2019-10-01 RX ORDER — FLUTICASONE PROPIONATE 50 MCG
SPRAY, SUSPENSION (ML) NASAL
COMMUNITY
End: 2024-03-26 | Stop reason: ALTCHOICE

## 2019-10-01 RX ADMIN — BETAMETHASONE SODIUM PHOSPHATE AND BETAMETHASONE ACETATE 6 MG: 3; 3 INJECTION, SUSPENSION INTRA-ARTICULAR; INTRALESIONAL; INTRAMUSCULAR; SOFT TISSUE at 01:10

## 2019-10-01 NOTE — LETTER
October 1, 2019      Mary Lopez MD  [address] [facility] [address] [place] [address]           [facility]  [address] [facility] [address] [place] [address]  Phone: [phone]  Fax: [phone]          Patient: Zahida Dodd   MR Number: [id number]   YOB: 1960   Date of Visit: 10/1/2019       Dear Dr. Mary Lopez:    Thank you for referring Zahida Dodd to me for evaluation. Attached you will find relevant portions of my assessment and plan of care.    If you have questions, please do not hesitate to call me. I look forward to following Zahida Dodd along with you.    Sincerely,    Brent Coronel MD    Enclosure  CC:  No Recipients    If you would like to receive this communication electronically, please contact [email or web address] or [phone] to request more information on [employer] Link access.    For providers and/or their staff who would like to refer a patient to [facility], please contact us through our one-stop-shop provider referral line, [facility], at [phone].    If you feel you have received this communication in error or would no longer like to receive these types of communications, please e-mail [email or web address]

## 2019-10-01 NOTE — PROCEDURES
Large Joint Aspiration/Injection: R subacromial bursa  Date/Time: 10/1/2019 1:30 PM  Performed by: Brent Coronel MD  Authorized by: Brent Coronel MD     Consent Done?:  Yes (Verbal)  Indications:  Pain  Procedure site marked: Yes    Timeout: Prior to procedure the correct patient, procedure, and site was verified    Anesthesia  Local anesthesia not used      Location:  Shoulder  Site:  R subacromial bursa  Prep: Patient was prepped and draped in usual sterile fashion    Needle size:  25 G  Ultrasonic Guidance for needle placement: Yes  Images are saved and documented.  Approach: Needle in plane, lateral to medial.  Medications:  6 mg betamethasone acetate-betamethasone sodium phosphate 6 mg/mL  Patient tolerance:  Patient tolerated the procedure well with no immediate complications    Additional Comments: Ultrasound guidance was used for correct needle placement, the images were saved will be uploaded to EMR.

## 2019-10-01 NOTE — PROGRESS NOTES
[facility]    Consulting Provider: Mary Lopez MD    CHIEF COMPLAINT:   Chief Complaint   Patient presents with    Shoulder Pain     right     HISTORY OF PRESENT ILLNESS: Zahida Dodd is a 59 y.o. female who presents to me today for evaluation of right shoulder pain. She was last seen in our clinic this summer concerning knee pain, which she says is improved now. She returns today on consultation for right shoulder pain. She says she has chronic shoulder pain, likely due to her previous job as a , but that it has been worsening over the past few weeks. There was no inciting trauma or incident. She says sometimes she cannot brush her her or do other ADLs due to this pain. She denies numbness or tingling in her arm. Sleeping on her right side aggravates the pain. She has been taking Advil and/or meloxicam for this pain, which gives her mild relief.     Review of Systems   Constitutional: Negative for fever.   HENT: Negative for drooling.    Eyes: Negative for discharge.   Respiratory: Negative for choking.    Cardiovascular: Negative for chest pain.   Genitourinary: Negative for flank pain.   Skin: Negative for wound.   Allergic/Immunologic: Negative for immunocompromised state.   Neurological: Negative for tremors and syncope.   Psychiatric/Behavioral: Negative for behavioral problems.     Past Medical History:   Past Medical History:   Diagnosis Date    Arthritis     Hyperlipidemia        Past Surgical History:   Past Surgical History:   Procedure Laterality Date    COLONOSCOPY  2014    repeat in 10    HYSTERECTOMY  2004    cervix present, on HRT for 7 years age 44-49    OOPHORECTOMY      REDUCTION OF BOTH BREASTS      TOTAL REDUCTION MAMMOPLASTY         Family History:   Family History   Problem Relation Age of Onset    Heart disease Mother     Dementia Mother     Breast cancer Neg Hx     Ovarian cancer Neg Hx        Medications:   Current Outpatient Medications on File  Prior to Visit   Medication Sig Dispense Refill    estradiol (VAGIFEM) 10 mcg Tab Place 1 tablet (10 mcg total) vaginally every Monday and Thursday. 24 tablet 3    fluticasone propionate (FLONASE) 50 mcg/actuation nasal spray fluticasone propionate 50 mcg/actuation nasal spray,suspension      loteprednol (LOTEMAX) 0.5 % ophthalmic suspension Lotemax 0.5 % eye drops,suspension      meloxicam (MOBIC) 15 MG tablet Take 15 mg by mouth once daily.      meloxicam (MOBIC) 15 MG tablet meloxicam 15 mg tablet      pravastatin (PRAVACHOL) 20 MG tablet       progesterone (PROMETRIUM) 100 MG capsule Take 1 capsule (100 mg total) by mouth nightly. 90 capsule 3    RESTASIS 0.05 % ophthalmic emulsion        No current facility-administered medications on file prior to visit.        Allergies:   Review of patient's allergies indicates:   Allergen Reactions    Penicillins Hives       Social History:   Social History     Socioeconomic History    Marital status:      Spouse name: Not on file    Number of children: Not on file    Years of education: Not on file    Highest education level: Not on file   Occupational History    Not on file   Social Needs    Financial resource strain: Not on file    Food insecurity:     Worry: Not on file     Inability: Not on file    Transportation needs:     Medical: Not on file     Non-medical: Not on file   Tobacco Use    Smoking status: Never Smoker   Substance and Sexual Activity    Alcohol use: Yes     Comment: rare    Drug use: Never    Sexual activity: Not Currently   Lifestyle    Physical activity:     Days per week: Not on file     Minutes per session: Not on file    Stress: Very much   Relationships    Social connections:     Talks on phone: Not on file     Gets together: Not on file     Attends Rastafarian service: Not on file     Active member of club or organization: Not on file     Attends meetings of clubs or organizations: Not on file     Relationship status:  "Not on file   Other Topics Concern    Not on file   Social History Narrative    Not on file     PHYSICAL EXAMINATION:   General    Vitals:    10/01/19 1324   BP: (!) 141/94   Pulse: 65   Weight: 97.1 kg (214 lb)   Height: 5' 6" (1.676 m)     Constitutional: Oriented to person, place, and time. No apparent distress. Pleasant.  HENT:   Head: Normocephalic and atraumatic.   Eyes: Right eye exhibits no discharge. Left eye exhibits no discharge. No scleral icterus.   Pulmonary/Chest: Effort normal. No respiratory distress.   Abdominal: There is no guarding.   Neurological: Alert and oriented to person, place, and time.   Psychiatric: Behavior is normal.   Right Shoulder Exam     Tenderness   Right shoulder tenderness location: posterior aspect of the joint.    Range of Motion   Active abduction: 120   Passive abduction: 140   External rotation: 80   Forward flexion: 140   Internal rotation 90 degrees: 80     Muscle Strength   Abduction: 4/5   Internal rotation: 5/5   External rotation: 5/5   Supraspinatus: 5/5   Subscapularis: 5/5   Biceps: 5/5     Tests   Espinal test: negative  Cross arm: negative  Impingement: positive  Drop arm: negative  Sulcus: absent    Other   Erythema: absent  Scars: absent  Sensation: normal  Pulse: present    Comments:  Decreased active abduction secondary to pain.  Weakness of abduction secondary to pain.      Left Shoulder Exam     Tenderness   The patient is experiencing no tenderness.     Range of Motion   Active abduction: normal   Passive abduction: normal   External rotation: normal   Forward flexion: normal   Internal rotation 90 degrees: normal     Muscle Strength   Abduction: 5/5   Internal rotation: 5/5   External rotation: 5/5   Biceps: 5/5     Other   Erythema: absent  Scars: absent  Sensation: normal  Pulse: present         INSPECTION: There is no swelling, ecchymoses, erythema or gross deformity of the right shoulder.    Imaging:    X-ray of the right shoulder from 9/05/2019: " "  FINDINGS:  There are degenerative changes glenohumeral joint and AC joint.  There are large peritendinous calcifications suggesting calcific tendinitis of the rotator cuff tendon there is bony eburnation the greater tuberosity of the humerus.  There is no acute fracture or dislocation      Impression       Degenerative changes and peritendinous soft tissue calcifications right shoulder.     Data Reviewed: X-ray    Supportive Actions: Independent visualization of images or test specimens    ASSESSMENT:   1. Tendinopathy of right rotator cuff      PLAN:     1. Time was spent reviewing the above diagnosis in depth with Zahida today, including acute management and rehabilitation.     2. I believe her calcific rotator cuff is the likely source of her pain. The combination of her radiographic and physical exam findings indicate that her calcific tendinitis likely contributes to this pain. For this reason, I believe she is a good candidate for a corticosteroid injection uunder ultrasound guidance and physical therapy. Should this not provide satisfactory relief of her symptoms, we will consider the Tenex procedure in the future to potentially remove some of the calcium focus.     3. RTC in 6-8 week if pain returns.    This is a consult from Dr. Mary Lopez. Please see the "Communications" section of Epic to see how the consulting physician received the report of today's findings and recommendations. If it's an [facility] physician, it will be forwarded to his/her "in basket".    The above note was completed, in part, with the aid of Dragon dictation software/hardware. Translation errors may be present.    "

## 2019-12-09 ENCOUNTER — INITIAL CONSULT (OUTPATIENT)
Dept: PHYSICAL MEDICINE AND REHAB | Facility: CLINIC | Age: 59
End: 2019-12-09
Payer: COMMERCIAL

## 2019-12-09 VITALS — WEIGHT: 214 LBS | BODY MASS INDEX: 34.39 KG/M2 | HEIGHT: 66 IN

## 2019-12-09 DIAGNOSIS — G89.29 CHRONIC PAIN OF RIGHT KNEE: ICD-10-CM

## 2019-12-09 DIAGNOSIS — M67.911 TENDINOPATHY OF RIGHT ROTATOR CUFF: Primary | ICD-10-CM

## 2019-12-09 DIAGNOSIS — M25.561 CHRONIC PAIN OF RIGHT KNEE: ICD-10-CM

## 2019-12-09 PROCEDURE — 76881 PR US EXTREMITY OR AXILLA, TISSUE/MUSCLE/JOINT/NERVE; COMPLETE: ICD-10-PCS | Mod: S$GLB,,, | Performed by: PHYSICAL MEDICINE & REHABILITATION

## 2019-12-09 PROCEDURE — 3008F PR BODY MASS INDEX (BMI) DOCUMENTED: ICD-10-PCS | Mod: CPTII,S$GLB,, | Performed by: PHYSICAL MEDICINE & REHABILITATION

## 2019-12-09 PROCEDURE — 99999 PR PBB SHADOW E&M-EST. PATIENT-LVL II: ICD-10-PCS | Mod: PBBFAC,,, | Performed by: PHYSICAL MEDICINE & REHABILITATION

## 2019-12-09 PROCEDURE — 99213 PR OFFICE/OUTPT VISIT, EST, LEVL III, 20-29 MIN: ICD-10-PCS | Mod: 25,S$GLB,, | Performed by: PHYSICAL MEDICINE & REHABILITATION

## 2019-12-09 PROCEDURE — 3008F BODY MASS INDEX DOCD: CPT | Mod: CPTII,S$GLB,, | Performed by: PHYSICAL MEDICINE & REHABILITATION

## 2019-12-09 PROCEDURE — 99999 PR PBB SHADOW E&M-EST. PATIENT-LVL II: CPT | Mod: PBBFAC,,, | Performed by: PHYSICAL MEDICINE & REHABILITATION

## 2019-12-09 PROCEDURE — 99213 OFFICE O/P EST LOW 20 MIN: CPT | Mod: 25,S$GLB,, | Performed by: PHYSICAL MEDICINE & REHABILITATION

## 2019-12-09 PROCEDURE — 76881 US COMPL JOINT R-T W/IMG: CPT | Mod: S$GLB,,, | Performed by: PHYSICAL MEDICINE & REHABILITATION

## 2019-12-09 RX ORDER — MELOXICAM 15 MG/1
15 TABLET ORAL DAILY
Qty: 30 TABLET | Refills: 0 | Status: SHIPPED | OUTPATIENT
Start: 2019-12-09 | End: 2021-02-08

## 2019-12-09 NOTE — PROGRESS NOTES
[facility]    Consulting Provider: Mary Lopez MD    CHIEF COMPLAINT:   Chief Complaint   Patient presents with    Shoulder Pain     right     HISTORY OF PRESENT ILLNESS: Zahida Dodd is a 59 y.o. female who presents to me today for follow-up of her chronic right shoulder pain.  I last saw her about 2 months ago, and at that time we performed injection of corticosteroid to the right subacromial bursa.  She reports significant pain reduction for about 1 month following that injection.  Since that time however pain is gradually return.  She rates her pain currently is an 8 on a scale of 1-10.  The pain is of the same type and location.  He locates her pain to the anterolateral aspect of the right shoulder.  The pain is increased with trying to use the arm, especially lifting the arm above her head.  She denies any popping, clicking, or grinding.  She denies any numbness or tingling in the arm.  She does have increased pain when lying on the right side bed at night.    Review of Systems   Constitutional: Negative for fever.   HENT: Negative for drooling.    Eyes: Negative for discharge.   Respiratory: Negative for choking.    Cardiovascular: Negative for chest pain.   Genitourinary: Negative for flank pain.   Skin: Negative for wound.   Allergic/Immunologic: Negative for immunocompromised state.   Neurological: Negative for tremors and syncope.   Psychiatric/Behavioral: Negative for behavioral problems.     Past Medical History:   Past Medical History:   Diagnosis Date    Arthritis     Hyperlipidemia        Past Surgical History:   Past Surgical History:   Procedure Laterality Date    COLONOSCOPY  2014    repeat in 10    HYSTERECTOMY  2004    cervix present, on HRT for 7 years age 44-49    OOPHORECTOMY      REDUCTION OF BOTH BREASTS      TOTAL REDUCTION MAMMOPLASTY         Family History:   Family History   Problem Relation Age of Onset    Heart disease Mother     Dementia Mother      Breast cancer Neg Hx     Ovarian cancer Neg Hx        Medications:   Current Outpatient Medications on File Prior to Visit   Medication Sig Dispense Refill    estradiol (VAGIFEM) 10 mcg Tab Place 1 tablet (10 mcg total) vaginally every Monday and Thursday. 24 tablet 3    fluticasone propionate (FLONASE) 50 mcg/actuation nasal spray fluticasone propionate 50 mcg/actuation nasal spray,suspension      loteprednol (LOTEMAX) 0.5 % ophthalmic suspension Lotemax 0.5 % eye drops,suspension      meloxicam (MOBIC) 15 MG tablet Take 15 mg by mouth once daily.      meloxicam (MOBIC) 15 MG tablet meloxicam 15 mg tablet      pravastatin (PRAVACHOL) 20 MG tablet       progesterone (PROMETRIUM) 100 MG capsule Take 1 capsule (100 mg total) by mouth nightly. 90 capsule 3    RESTASIS 0.05 % ophthalmic emulsion        No current facility-administered medications on file prior to visit.        Allergies:   Review of patient's allergies indicates:   Allergen Reactions    Penicillins Hives       Social History:   Social History     Socioeconomic History    Marital status:      Spouse name: Not on file    Number of children: Not on file    Years of education: Not on file    Highest education level: Not on file   Occupational History    Not on file   Social Needs    Financial resource strain: Not on file    Food insecurity:     Worry: Not on file     Inability: Not on file    Transportation needs:     Medical: Not on file     Non-medical: Not on file   Tobacco Use    Smoking status: Never Smoker   Substance and Sexual Activity    Alcohol use: Yes     Comment: rare    Drug use: Never    Sexual activity: Not Currently   Lifestyle    Physical activity:     Days per week: Not on file     Minutes per session: Not on file    Stress: Very much   Relationships    Social connections:     Talks on phone: Not on file     Gets together: Not on file     Attends Roman Catholic service: Not on file     Active member of club or  "organization: Not on file     Attends meetings of clubs or organizations: Not on file     Relationship status: Not on file   Other Topics Concern    Not on file   Social History Narrative    Not on file     PHYSICAL EXAMINATION:   General    Vitals:    12/09/19 0814   Weight: 97.1 kg (214 lb)   Height: 5' 6" (1.676 m)     Constitutional: Oriented to person, place, and time. No apparent distress. Pleasant.  HENT:   Head: Normocephalic and atraumatic.   Eyes: Right eye exhibits no discharge. Left eye exhibits no discharge. No scleral icterus.   Pulmonary/Chest: Effort normal. No respiratory distress.   Abdominal: There is no guarding.   Neurological: Alert and oriented to person, place, and time.   Psychiatric: Behavior is normal.   Right Shoulder Exam     Tenderness   The patient is experiencing no tenderness.    Range of Motion   Active abduction: 120   Passive abduction: 140   External rotation: 80   Forward flexion: 140   Internal rotation 90 degrees: 70     Muscle Strength   Right shoulder normal muscle strength: Complaints of pain with resisted movements about the right shoulder.  Abduction: 4/5   Internal rotation: 5/5   External rotation: 4/5   Supraspinatus: 5/5   Subscapularis: 5/5   Biceps: 5/5     Tests   Espinal test: positive  Cross arm: negative  Impingement: positive  Drop arm: negative  Sulcus: absent    Other   Erythema: absent  Scars: absent  Sensation: normal  Pulse: present    Comments:  Decreased active abduction secondary to pain.  Weakness of abduction secondary to pain.      Left Shoulder Exam     Tenderness   The patient is experiencing no tenderness.     Range of Motion   Active abduction: normal   Passive abduction: normal   External rotation: normal   Forward flexion: normal   Internal rotation 90 degrees: normal     Muscle Strength   Abduction: 5/5   Internal rotation: 5/5   External rotation: 5/5   Biceps: 5/5     Other   Erythema: absent  Scars: absent  Sensation: normal  Pulse: " present         INSPECTION: There is no swelling, ecchymoses, erythema or gross deformity of the right shoulder.    Imaging:    X-ray of the right shoulder from 9/05/2019:   FINDINGS:  There are degenerative changes glenohumeral joint and AC joint.  There are large peritendinous calcifications suggesting calcific tendinitis of the rotator cuff tendon there is bony eburnation the greater tuberosity of the humerus.  There is no acute fracture or dislocation      Impression       Degenerative changes and peritendinous soft tissue calcifications right shoulder.     Diagnostic ultrasound exam  Limited diagnostic exams performed today of the right shoulder, the images were saved and will be uploaded to EMR.  The right shoulder was examined and findings were as follows: the long head of the biceps tendon was observed to have a normal fibular appearance and was positioned appropriately in the bicipital groove. There was a small amount of hypoechoic fluid surrounding the tendon. The biceps tendon did not reveal subluxation on dynamic imaging. There is no visible evidence for coracohumeral impingement. The acromioclavicular joint was normal in appearance. There is no evidence for abnormal translation of the acromioclavicular joint on cross-body adduction.      The rotator cuff was examined in detail. The subscapularis is heterogenous in echotexture consistent with tendinopathy. The supraspinatus was abnormal with a large hyperechoic focus superficially with posterior shadowing suggestive of calcific tendinopathy. There was cortical irregularity of the greater tuberosity present. The infraspinatus was heterogenous in internal echotexture consistent with tendonopathy. The teres minor was not assessed. The subacromial/subdeltoid bursa was somewhat thickened.     There does not appear to be any effusion within the posterior glenohumeral recess. The spinoglenoid notch is normal, without a suprascapular ganglion cyst.  It    Data  "Reviewed: X-ray, ultrasound    Supportive Actions: Independent visualization of images or test specimens    ASSESSMENT:   1. Tendinopathy of right rotator cuff      PLAN:     1.  I discussed with Ms. Dodd, that her signs, symptoms, x-ray, and today's diagnostic ultrasound exam are all consistent with diagnosis of right rotator cuff calcific tendinopathy.  We had a tendon for her to use absent physical therapy at her last visit, however for whatever reason, this was not done.  I do believe physical therapy would benefit her in the long term, and she is very motivated to start physical therapy.  However, due to the large calcific focus seen on imaging, we discussed that interventions would likely be necessary to in order for her to have the best chance at long-term pain relief.  To this end, we discussed the Tenex procedure as a minimally invasive method to attempt to remove this calcific focus.  She is interested in this procedure and will contact her insurance to confirm that it would be covered.  We discussed that this would likely overall improve her condition, but complete and permanent pain resolution does not guarantee.  We discussed the recovery is gradual in may take over 12 weeks.  She voiced understanding.    2.  We will hold off on physical therapy may consider starting after the Tenex procedure.    3.  I will refill her Mobic 15 mg to take daily to help with her pain until she returns for further interventions.    4. She will contact us if she would like to schedule for the Tenex procedure to the right rotator cuff.    This is a consult from Dr. Mary Lopez. Please see the "Communications" section of Epic to see how the consulting physician received the report of today's findings and recommendations. If it's an [facility] physician, it will be forwarded to his/her "in basket".    The above note was completed, in part, with the aid of Dragon dictation software/hardware. Translation errors may be " present.

## 2019-12-09 NOTE — LETTER
December 9, 2019      Mary Lopez MD  [address] [facility] [address] [place] [address]           [facility]  [address] [facility] [address] [place] [address]  Phone: [phone]  Fax: [phone]          Patient: Zahida Dodd   MR Number: [id number]   YOB: 1960   Date of Visit: 12/9/2019       Dear Dr. Mary Lopez:    Thank you for referring Zahida Dodd to me for evaluation. Attached you will find relevant portions of my assessment and plan of care.    If you have questions, please do not hesitate to call me. I look forward to following Zahida Dodd along with you.    Sincerely,    Brent Coronel MD    Enclosure  CC:  No Recipients    If you would like to receive this communication electronically, please contact [email or web address] or [phone] to request more information on [employer] access.    For providers and/or their staff who would like to refer a patient to [facility], please contact us through our one-stop-shop provider referral line, [facility], at [phone].    If you feel you have received this communication in error or would no longer like to receive these types of communications, please e-mail [email or web address]

## 2020-05-26 RX ORDER — PRAVASTATIN SODIUM 20 MG/1
20 TABLET ORAL NIGHTLY
Qty: 90 TABLET | Refills: 1 | Status: SHIPPED | OUTPATIENT
Start: 2020-05-26 | End: 2021-05-19 | Stop reason: SDUPTHER

## 2020-05-26 NOTE — TELEPHONE ENCOUNTER
----- Message from Manas Ceron sent at 5/26/2020  9:23 AM CDT -----  Contact: Pt   Pt is calling the staff regarding a refill RXpravastatin (PRAVACHOL) 20 MG tablet   Once a day / 90 day       [facility] - [address]  [address]  [facility] [address]  Phone: [phone] Fax: [phone]    Pt call back [phone]    Thanks

## 2020-10-05 ENCOUNTER — PATIENT MESSAGE (OUTPATIENT)
Dept: ADMINISTRATIVE | Facility: HOSPITAL | Age: 60
End: 2020-10-05

## 2021-01-04 ENCOUNTER — PATIENT MESSAGE (OUTPATIENT)
Dept: ADMINISTRATIVE | Facility: HOSPITAL | Age: 61
End: 2021-01-04

## 2021-01-06 DIAGNOSIS — Z12.31 OTHER SCREENING MAMMOGRAM: ICD-10-CM

## 2021-02-08 ENCOUNTER — TELEPHONE (OUTPATIENT)
Dept: FAMILY MEDICINE | Facility: CLINIC | Age: 61
End: 2021-02-08

## 2021-02-08 ENCOUNTER — OFFICE VISIT (OUTPATIENT)
Dept: FAMILY MEDICINE | Facility: CLINIC | Age: 61
End: 2021-02-08
Payer: COMMERCIAL

## 2021-02-08 VITALS
SYSTOLIC BLOOD PRESSURE: 136 MMHG | DIASTOLIC BLOOD PRESSURE: 94 MMHG | HEART RATE: 98 BPM | OXYGEN SATURATION: 98 % | BODY MASS INDEX: 35.9 KG/M2 | WEIGHT: 222.44 LBS

## 2021-02-08 DIAGNOSIS — Z00.00 PREVENTATIVE HEALTH CARE: ICD-10-CM

## 2021-02-08 DIAGNOSIS — R10.32 LLQ PAIN: ICD-10-CM

## 2021-02-08 DIAGNOSIS — K57.92 ACUTE DIVERTICULITIS: Primary | ICD-10-CM

## 2021-02-08 LAB
BILIRUB UR QL STRIP: NEGATIVE
CLARITY UR: CLEAR
COLOR UR: YELLOW
GLUCOSE UR QL STRIP: NEGATIVE
HGB UR QL STRIP: NEGATIVE
KETONES UR QL STRIP: NEGATIVE
LEUKOCYTE ESTERASE UR QL STRIP: NEGATIVE
NITRITE UR QL STRIP: NEGATIVE
PH UR STRIP: 7 [PH] (ref 5–8)
PROT UR QL STRIP: NEGATIVE
SP GR UR STRIP: 1.01 (ref 1–1.03)
URN SPEC COLLECT METH UR: NORMAL

## 2021-02-08 PROCEDURE — 81003 URINALYSIS AUTO W/O SCOPE: CPT | Mod: PO

## 2021-02-08 PROCEDURE — 99999 PR PBB SHADOW E&M-EST. PATIENT-LVL III: CPT | Mod: PBBFAC,,, | Performed by: PHYSICIAN ASSISTANT

## 2021-02-08 PROCEDURE — 99999 PR PBB SHADOW E&M-EST. PATIENT-LVL III: ICD-10-PCS | Mod: PBBFAC,,, | Performed by: PHYSICIAN ASSISTANT

## 2021-02-08 PROCEDURE — 99214 OFFICE O/P EST MOD 30 MIN: CPT | Mod: S$GLB,,, | Performed by: PHYSICIAN ASSISTANT

## 2021-02-08 PROCEDURE — 3008F BODY MASS INDEX DOCD: CPT | Mod: CPTII,S$GLB,, | Performed by: PHYSICIAN ASSISTANT

## 2021-02-08 PROCEDURE — 3008F PR BODY MASS INDEX (BMI) DOCUMENTED: ICD-10-PCS | Mod: CPTII,S$GLB,, | Performed by: PHYSICIAN ASSISTANT

## 2021-02-08 PROCEDURE — 99214 PR OFFICE/OUTPT VISIT, EST, LEVL IV, 30-39 MIN: ICD-10-PCS | Mod: S$GLB,,, | Performed by: PHYSICIAN ASSISTANT

## 2021-02-08 RX ORDER — AMOXICILLIN 500 MG
CAPSULE ORAL DAILY
COMMUNITY

## 2021-02-09 ENCOUNTER — HOSPITAL ENCOUNTER (OUTPATIENT)
Dept: RADIOLOGY | Facility: HOSPITAL | Age: 61
Discharge: HOME | End: 2021-02-09
Attending: PHYSICIAN ASSISTANT
Payer: COMMERCIAL

## 2021-02-09 ENCOUNTER — HOSPITAL ENCOUNTER (OUTPATIENT)
Dept: RADIOLOGY | Facility: HOSPITAL | Age: 61
Discharge: HOME | End: 2021-02-09
Attending: FAMILY MEDICINE
Payer: COMMERCIAL

## 2021-02-09 VITALS — HEIGHT: 66 IN | WEIGHT: 222 LBS | BODY MASS INDEX: 35.68 KG/M2

## 2021-02-09 DIAGNOSIS — Z12.31 OTHER SCREENING MAMMOGRAM: ICD-10-CM

## 2021-02-09 DIAGNOSIS — R10.32 LEFT LOWER QUADRANT PAIN: ICD-10-CM

## 2021-02-09 PROCEDURE — 74177 CT ABDOMEN PELVIS WITH CONTRAST: ICD-10-PCS | Mod: 26,,, | Performed by: RADIOLOGY

## 2021-02-09 PROCEDURE — 74177 CT ABD & PELVIS W/CONTRAST: CPT | Mod: 26,,, | Performed by: RADIOLOGY

## 2021-02-09 PROCEDURE — 25500020 PHARM REV CODE 255: Mod: PO | Performed by: PHYSICIAN ASSISTANT

## 2021-02-09 PROCEDURE — 77067 SCR MAMMO BI INCL CAD: CPT | Mod: TC,PO

## 2021-02-09 PROCEDURE — 77063 BREAST TOMOSYNTHESIS BI: CPT | Mod: 26,,, | Performed by: RADIOLOGY

## 2021-02-09 PROCEDURE — 77067 SCR MAMMO BI INCL CAD: CPT | Mod: 26,,, | Performed by: RADIOLOGY

## 2021-02-09 PROCEDURE — 77063 MAMMO DIGITAL SCREENING BILAT WITH TOMO: ICD-10-PCS | Mod: 26,,, | Performed by: RADIOLOGY

## 2021-02-09 PROCEDURE — 74177 CT ABD & PELVIS W/CONTRAST: CPT | Mod: TC,PO

## 2021-02-09 PROCEDURE — A9698 NON-RAD CONTRAST MATERIALNOC: HCPCS | Mod: PO | Performed by: PHYSICIAN ASSISTANT

## 2021-02-09 PROCEDURE — 77067 MAMMO DIGITAL SCREENING BILAT WITH TOMO: ICD-10-PCS | Mod: 26,,, | Performed by: RADIOLOGY

## 2021-02-09 RX ORDER — CIPROFLOXACIN 500 MG/1
500 TABLET ORAL 2 TIMES DAILY
Qty: 20 TABLET | Refills: 0 | Status: SHIPPED | OUTPATIENT
Start: 2021-02-09 | End: 2021-02-19

## 2021-02-09 RX ORDER — METRONIDAZOLE 500 MG/1
500 TABLET ORAL EVERY 8 HOURS
Qty: 30 TABLET | Refills: 0 | Status: SHIPPED | OUTPATIENT
Start: 2021-02-09 | End: 2021-02-19

## 2021-02-09 RX ADMIN — IOHEXOL 1000 ML: 12 SOLUTION ORAL at 04:02

## 2021-02-09 RX ADMIN — IOHEXOL 100 ML: 350 INJECTION, SOLUTION INTRAVENOUS at 04:02

## 2021-02-17 ENCOUNTER — TELEPHONE (OUTPATIENT)
Dept: FAMILY MEDICINE | Facility: CLINIC | Age: 61
End: 2021-02-17

## 2021-02-19 ENCOUNTER — OFFICE VISIT (OUTPATIENT)
Dept: FAMILY MEDICINE | Facility: CLINIC | Age: 61
End: 2021-02-19
Payer: COMMERCIAL

## 2021-02-19 ENCOUNTER — TELEPHONE (OUTPATIENT)
Dept: GASTROENTEROLOGY | Facility: CLINIC | Age: 61
End: 2021-02-19

## 2021-02-19 VITALS
WEIGHT: 222.25 LBS | BODY MASS INDEX: 35.87 KG/M2 | SYSTOLIC BLOOD PRESSURE: 112 MMHG | DIASTOLIC BLOOD PRESSURE: 86 MMHG | HEART RATE: 95 BPM | OXYGEN SATURATION: 97 %

## 2021-02-19 DIAGNOSIS — B37.2 SKIN YEAST INFECTION: ICD-10-CM

## 2021-02-19 DIAGNOSIS — E66.09 CLASS 1 OBESITY DUE TO EXCESS CALORIES WITHOUT SERIOUS COMORBIDITY WITH BODY MASS INDEX (BMI) OF 34.0 TO 34.9 IN ADULT: ICD-10-CM

## 2021-02-19 DIAGNOSIS — K57.92 ACUTE DIVERTICULITIS: Primary | ICD-10-CM

## 2021-02-19 DIAGNOSIS — Z20.822 ENCOUNTER FOR LABORATORY TESTING FOR COVID-19 VIRUS: ICD-10-CM

## 2021-02-19 DIAGNOSIS — Z01.00 EYE EXAM, ROUTINE: ICD-10-CM

## 2021-02-19 PROCEDURE — 3008F BODY MASS INDEX DOCD: CPT | Mod: CPTII,S$GLB,, | Performed by: PHYSICIAN ASSISTANT

## 2021-02-19 PROCEDURE — 3008F PR BODY MASS INDEX (BMI) DOCUMENTED: ICD-10-PCS | Mod: CPTII,S$GLB,, | Performed by: PHYSICIAN ASSISTANT

## 2021-02-19 PROCEDURE — 99999 PR PBB SHADOW E&M-EST. PATIENT-LVL V: CPT | Mod: PBBFAC,,, | Performed by: PHYSICIAN ASSISTANT

## 2021-02-19 PROCEDURE — 99999 PR PBB SHADOW E&M-EST. PATIENT-LVL V: ICD-10-PCS | Mod: PBBFAC,,, | Performed by: PHYSICIAN ASSISTANT

## 2021-02-19 PROCEDURE — 99214 OFFICE O/P EST MOD 30 MIN: CPT | Mod: S$GLB,,, | Performed by: PHYSICIAN ASSISTANT

## 2021-02-19 PROCEDURE — 99214 PR OFFICE/OUTPT VISIT, EST, LEVL IV, 30-39 MIN: ICD-10-PCS | Mod: S$GLB,,, | Performed by: PHYSICIAN ASSISTANT

## 2021-02-19 RX ORDER — CLOTRIMAZOLE AND BETAMETHASONE DIPROPIONATE 10; .5 MG/ML; MG/ML
LOTION TOPICAL 2 TIMES DAILY
Qty: 30 ML | Refills: 0 | Status: SHIPPED | OUTPATIENT
Start: 2021-02-19 | End: 2021-02-26

## 2021-02-26 ENCOUNTER — PATIENT OUTREACH (OUTPATIENT)
Dept: ADMINISTRATIVE | Facility: OTHER | Age: 61
End: 2021-02-26

## 2021-03-02 ENCOUNTER — TELEPHONE (OUTPATIENT)
Dept: GASTROENTEROLOGY | Facility: CLINIC | Age: 61
End: 2021-03-02

## 2021-03-02 ENCOUNTER — OFFICE VISIT (OUTPATIENT)
Dept: OPTOMETRY | Facility: CLINIC | Age: 61
End: 2021-03-02
Payer: COMMERCIAL

## 2021-03-02 DIAGNOSIS — H52.13 MYOPIA WITH ASTIGMATISM AND PRESBYOPIA, BILATERAL: ICD-10-CM

## 2021-03-02 DIAGNOSIS — Z96.89: ICD-10-CM

## 2021-03-02 DIAGNOSIS — Z13.5 GLAUCOMA SCREENING: ICD-10-CM

## 2021-03-02 DIAGNOSIS — H52.203 MYOPIA WITH ASTIGMATISM AND PRESBYOPIA, BILATERAL: ICD-10-CM

## 2021-03-02 DIAGNOSIS — Z01.00 EYE EXAM, ROUTINE: ICD-10-CM

## 2021-03-02 DIAGNOSIS — M35.01 KERATITIS SICCA, BILATERAL: ICD-10-CM

## 2021-03-02 DIAGNOSIS — H43.813 POSTERIOR VITREOUS DETACHMENT, BILATERAL: Primary | ICD-10-CM

## 2021-03-02 DIAGNOSIS — H52.4 MYOPIA WITH ASTIGMATISM AND PRESBYOPIA, BILATERAL: ICD-10-CM

## 2021-03-02 PROCEDURE — 92004 COMPRE OPH EXAM NEW PT 1/>: CPT | Mod: S$GLB,,, | Performed by: OPTOMETRIST

## 2021-03-02 PROCEDURE — 92015 DETERMINE REFRACTIVE STATE: CPT | Mod: S$GLB,,, | Performed by: OPTOMETRIST

## 2021-03-02 PROCEDURE — 1126F PR PAIN SEVERITY QUANTIFIED, NO PAIN PRESENT: ICD-10-PCS | Mod: S$GLB,,, | Performed by: OPTOMETRIST

## 2021-03-02 PROCEDURE — 99999 PR PBB SHADOW E&M-EST. PATIENT-LVL III: CPT | Mod: PBBFAC,,, | Performed by: OPTOMETRIST

## 2021-03-02 PROCEDURE — 1126F AMNT PAIN NOTED NONE PRSNT: CPT | Mod: S$GLB,,, | Performed by: OPTOMETRIST

## 2021-03-02 PROCEDURE — 99999 PR PBB SHADOW E&M-EST. PATIENT-LVL III: ICD-10-PCS | Mod: PBBFAC,,, | Performed by: OPTOMETRIST

## 2021-03-02 PROCEDURE — 92004 PR EYE EXAM, NEW PATIENT,COMPREHESV: ICD-10-PCS | Mod: S$GLB,,, | Performed by: OPTOMETRIST

## 2021-03-02 PROCEDURE — 92015 PR REFRACTION: ICD-10-PCS | Mod: S$GLB,,, | Performed by: OPTOMETRIST

## 2021-03-02 RX ORDER — LIFITEGRAST 50 MG/ML
1 SOLUTION/ DROPS OPHTHALMIC 2 TIMES DAILY
Qty: 4 ML | Refills: 11 | Status: SHIPPED | OUTPATIENT
Start: 2021-03-02 | End: 2021-03-15 | Stop reason: SDUPTHER

## 2021-03-12 DIAGNOSIS — M35.01 KERATITIS SICCA, BILATERAL: ICD-10-CM

## 2021-03-15 DIAGNOSIS — M35.01 KERATITIS SICCA, BILATERAL: ICD-10-CM

## 2021-03-15 RX ORDER — LIFITEGRAST 50 MG/ML
1 SOLUTION/ DROPS OPHTHALMIC 2 TIMES DAILY
Qty: 4 ML | Refills: 11 | Status: CANCELLED | OUTPATIENT
Start: 2021-03-15 | End: 2022-03-15

## 2021-03-15 RX ORDER — LIFITEGRAST 50 MG/ML
1 SOLUTION/ DROPS OPHTHALMIC 2 TIMES DAILY
Qty: 4 ML | Refills: 11 | Status: SHIPPED | OUTPATIENT
Start: 2021-03-15 | End: 2022-03-15

## 2021-03-21 ENCOUNTER — LAB VISIT (OUTPATIENT)
Dept: FAMILY MEDICINE | Facility: CLINIC | Age: 61
End: 2021-03-21
Payer: COMMERCIAL

## 2021-03-21 DIAGNOSIS — Z20.822 ENCOUNTER FOR LABORATORY TESTING FOR COVID-19 VIRUS: ICD-10-CM

## 2021-03-21 PROCEDURE — U0005 INFEC AGEN DETEC AMPLI PROBE: HCPCS | Performed by: INTERNAL MEDICINE

## 2021-03-21 PROCEDURE — U0003 INFECTIOUS AGENT DETECTION BY NUCLEIC ACID (DNA OR RNA); SEVERE ACUTE RESPIRATORY SYNDROME CORONAVIRUS 2 (SARS-COV-2) (CORONAVIRUS DISEASE [COVID-19]), AMPLIFIED PROBE TECHNIQUE, MAKING USE OF HIGH THROUGHPUT TECHNOLOGIES AS DESCRIBED BY CMS-2020-01-R: HCPCS | Performed by: INTERNAL MEDICINE

## 2021-03-22 LAB — SARS-COV-2 RNA RESP QL NAA+PROBE: NOT DETECTED

## 2021-03-23 ENCOUNTER — TELEPHONE (OUTPATIENT)
Dept: GASTROENTEROLOGY | Facility: CLINIC | Age: 61
End: 2021-03-23

## 2021-03-24 PROBLEM — R93.5 ABNORMAL CT OF THE ABDOMEN: Status: ACTIVE | Noted: 2021-03-24

## 2021-03-25 ENCOUNTER — TELEPHONE (OUTPATIENT)
Dept: GASTROENTEROLOGY | Facility: CLINIC | Age: 61
End: 2021-03-25

## 2021-05-03 ENCOUNTER — TELEPHONE (OUTPATIENT)
Dept: GASTROENTEROLOGY | Facility: CLINIC | Age: 61
End: 2021-05-03

## 2021-05-04 ENCOUNTER — OFFICE VISIT (OUTPATIENT)
Dept: FAMILY MEDICINE | Facility: CLINIC | Age: 61
End: 2021-05-04
Payer: COMMERCIAL

## 2021-05-04 VITALS
SYSTOLIC BLOOD PRESSURE: 116 MMHG | OXYGEN SATURATION: 97 % | BODY MASS INDEX: 33.55 KG/M2 | HEIGHT: 66 IN | HEART RATE: 79 BPM | DIASTOLIC BLOOD PRESSURE: 70 MMHG | WEIGHT: 208.75 LBS

## 2021-05-04 DIAGNOSIS — L98.9 SKIN LESION: Primary | ICD-10-CM

## 2021-05-04 PROCEDURE — 3008F PR BODY MASS INDEX (BMI) DOCUMENTED: ICD-10-PCS | Mod: CPTII,S$GLB,, | Performed by: PHYSICIAN ASSISTANT

## 2021-05-04 PROCEDURE — 1125F PR PAIN SEVERITY QUANTIFIED, PAIN PRESENT: ICD-10-PCS | Mod: S$GLB,,, | Performed by: PHYSICIAN ASSISTANT

## 2021-05-04 PROCEDURE — 99213 PR OFFICE/OUTPT VISIT, EST, LEVL III, 20-29 MIN: ICD-10-PCS | Mod: S$GLB,,, | Performed by: PHYSICIAN ASSISTANT

## 2021-05-04 PROCEDURE — 1125F AMNT PAIN NOTED PAIN PRSNT: CPT | Mod: S$GLB,,, | Performed by: PHYSICIAN ASSISTANT

## 2021-05-04 PROCEDURE — 99999 PR PBB SHADOW E&M-EST. PATIENT-LVL III: CPT | Mod: PBBFAC,,, | Performed by: PHYSICIAN ASSISTANT

## 2021-05-04 PROCEDURE — 3008F BODY MASS INDEX DOCD: CPT | Mod: CPTII,S$GLB,, | Performed by: PHYSICIAN ASSISTANT

## 2021-05-04 PROCEDURE — 99999 PR PBB SHADOW E&M-EST. PATIENT-LVL III: ICD-10-PCS | Mod: PBBFAC,,, | Performed by: PHYSICIAN ASSISTANT

## 2021-05-04 PROCEDURE — 99213 OFFICE O/P EST LOW 20 MIN: CPT | Mod: S$GLB,,, | Performed by: PHYSICIAN ASSISTANT

## 2021-05-04 RX ORDER — MUPIROCIN 20 MG/G
OINTMENT TOPICAL 3 TIMES DAILY
Qty: 1 TUBE | Refills: 0 | Status: SHIPPED | OUTPATIENT
Start: 2021-05-04 | End: 2021-05-11

## 2021-05-10 PROBLEM — R93.3 ABNORMAL CT SCAN, COLON: Status: ACTIVE | Noted: 2021-05-10

## 2021-05-19 ENCOUNTER — OFFICE VISIT (OUTPATIENT)
Dept: FAMILY MEDICINE | Facility: CLINIC | Age: 61
End: 2021-05-19
Payer: COMMERCIAL

## 2021-05-19 VITALS
HEART RATE: 74 BPM | TEMPERATURE: 99 F | HEIGHT: 66 IN | DIASTOLIC BLOOD PRESSURE: 78 MMHG | WEIGHT: 207.88 LBS | BODY MASS INDEX: 33.41 KG/M2 | SYSTOLIC BLOOD PRESSURE: 110 MMHG | OXYGEN SATURATION: 96 %

## 2021-05-19 DIAGNOSIS — R74.8 INCREASED LIVER ENZYMES: Primary | ICD-10-CM

## 2021-05-19 DIAGNOSIS — K57.90 DIVERTICULOSIS: ICD-10-CM

## 2021-05-19 DIAGNOSIS — E78.49 OTHER HYPERLIPIDEMIA: ICD-10-CM

## 2021-05-19 DIAGNOSIS — M47.815 SPONDYLOSIS OF THORACOLUMBAR REGION WITHOUT MYELOPATHY OR RADICULOPATHY: ICD-10-CM

## 2021-05-19 DIAGNOSIS — K76.0 FATTY INFILTRATION OF LIVER: ICD-10-CM

## 2021-05-19 PROCEDURE — 1126F AMNT PAIN NOTED NONE PRSNT: CPT | Mod: S$GLB,,, | Performed by: FAMILY MEDICINE

## 2021-05-19 PROCEDURE — 3008F BODY MASS INDEX DOCD: CPT | Mod: CPTII,S$GLB,, | Performed by: FAMILY MEDICINE

## 2021-05-19 PROCEDURE — 99999 PR PBB SHADOW E&M-EST. PATIENT-LVL IV: ICD-10-PCS | Mod: PBBFAC,,, | Performed by: FAMILY MEDICINE

## 2021-05-19 PROCEDURE — 99214 OFFICE O/P EST MOD 30 MIN: CPT | Mod: S$GLB,,, | Performed by: FAMILY MEDICINE

## 2021-05-19 PROCEDURE — 1126F PR PAIN SEVERITY QUANTIFIED, NO PAIN PRESENT: ICD-10-PCS | Mod: S$GLB,,, | Performed by: FAMILY MEDICINE

## 2021-05-19 PROCEDURE — 3008F PR BODY MASS INDEX (BMI) DOCUMENTED: ICD-10-PCS | Mod: CPTII,S$GLB,, | Performed by: FAMILY MEDICINE

## 2021-05-19 PROCEDURE — 99214 PR OFFICE/OUTPT VISIT, EST, LEVL IV, 30-39 MIN: ICD-10-PCS | Mod: S$GLB,,, | Performed by: FAMILY MEDICINE

## 2021-05-19 PROCEDURE — 99999 PR PBB SHADOW E&M-EST. PATIENT-LVL IV: CPT | Mod: PBBFAC,,, | Performed by: FAMILY MEDICINE

## 2021-05-19 RX ORDER — PRAVASTATIN SODIUM 20 MG/1
20 TABLET ORAL NIGHTLY
Qty: 90 TABLET | Refills: 3 | Status: SHIPPED | OUTPATIENT
Start: 2021-05-19 | End: 2022-10-31

## 2021-05-20 ENCOUNTER — LAB VISIT (OUTPATIENT)
Dept: LAB | Facility: HOSPITAL | Age: 61
End: 2021-05-20
Attending: FAMILY MEDICINE
Payer: COMMERCIAL

## 2021-05-20 ENCOUNTER — CLINICAL SUPPORT (OUTPATIENT)
Dept: FAMILY MEDICINE | Facility: CLINIC | Age: 61
End: 2021-05-20
Payer: COMMERCIAL

## 2021-05-20 DIAGNOSIS — Z23 NEED FOR SHINGLES VACCINE: Primary | ICD-10-CM

## 2021-05-20 DIAGNOSIS — R74.8 INCREASED LIVER ENZYMES: ICD-10-CM

## 2021-05-20 LAB
ALBUMIN SERPL BCP-MCNC: 3.9 G/DL (ref 3.5–5.2)
ALP SERPL-CCNC: 57 U/L (ref 55–135)
ALT SERPL W/O P-5'-P-CCNC: 36 U/L (ref 10–44)
AST SERPL-CCNC: 32 U/L (ref 10–40)
BILIRUB DIRECT SERPL-MCNC: 0.2 MG/DL (ref 0.1–0.3)
BILIRUB SERPL-MCNC: 0.5 MG/DL (ref 0.1–1)
PROT SERPL-MCNC: 6.9 G/DL (ref 6–8.4)

## 2021-05-20 PROCEDURE — 90750 HZV VACC RECOMBINANT IM: CPT | Mod: S$GLB,,, | Performed by: FAMILY MEDICINE

## 2021-05-20 PROCEDURE — 90750 ZOSTER RECOMBINANT VACCINE: ICD-10-PCS | Mod: S$GLB,,, | Performed by: FAMILY MEDICINE

## 2021-05-20 PROCEDURE — 90471 IMMUNIZATION ADMIN: CPT | Mod: S$GLB,,, | Performed by: FAMILY MEDICINE

## 2021-05-20 PROCEDURE — 90471 ZOSTER RECOMBINANT VACCINE: ICD-10-PCS | Mod: S$GLB,,, | Performed by: FAMILY MEDICINE

## 2021-05-20 PROCEDURE — 80076 HEPATIC FUNCTION PANEL: CPT | Performed by: FAMILY MEDICINE

## 2021-05-20 PROCEDURE — 36415 COLL VENOUS BLD VENIPUNCTURE: CPT | Mod: PO | Performed by: FAMILY MEDICINE

## 2021-05-22 ENCOUNTER — NURSE TRIAGE (OUTPATIENT)
Dept: ADMINISTRATIVE | Facility: CLINIC | Age: 61
End: 2021-05-22

## 2022-02-21 ENCOUNTER — PATIENT MESSAGE (OUTPATIENT)
Dept: FAMILY MEDICINE | Facility: CLINIC | Age: 62
End: 2022-02-21

## 2022-02-21 ENCOUNTER — OFFICE VISIT (OUTPATIENT)
Dept: FAMILY MEDICINE | Facility: CLINIC | Age: 62
End: 2022-02-21
Payer: COMMERCIAL

## 2022-02-21 ENCOUNTER — LAB VISIT (OUTPATIENT)
Dept: LAB | Facility: HOSPITAL | Age: 62
End: 2022-02-21
Attending: FAMILY MEDICINE
Payer: COMMERCIAL

## 2022-02-21 VITALS
HEART RATE: 66 BPM | RESPIRATION RATE: 18 BRPM | HEIGHT: 66 IN | WEIGHT: 201.5 LBS | BODY MASS INDEX: 32.38 KG/M2 | SYSTOLIC BLOOD PRESSURE: 104 MMHG | OXYGEN SATURATION: 99 % | DIASTOLIC BLOOD PRESSURE: 60 MMHG

## 2022-02-21 DIAGNOSIS — Z00.01 ANNUAL VISIT FOR GENERAL ADULT MEDICAL EXAMINATION WITH ABNORMAL FINDINGS: ICD-10-CM

## 2022-02-21 DIAGNOSIS — Z00.01 ANNUAL VISIT FOR GENERAL ADULT MEDICAL EXAMINATION WITH ABNORMAL FINDINGS: Primary | ICD-10-CM

## 2022-02-21 DIAGNOSIS — M35.01 KERATITIS SICCA, BILATERAL: ICD-10-CM

## 2022-02-21 DIAGNOSIS — Z12.31 ENCOUNTER FOR SCREENING MAMMOGRAM FOR MALIGNANT NEOPLASM OF BREAST: ICD-10-CM

## 2022-02-21 PROBLEM — H16.223 KERATITIS SICCA, BILATERAL: Status: ACTIVE | Noted: 2022-02-21

## 2022-02-21 LAB
ALBUMIN SERPL BCP-MCNC: 3.7 G/DL (ref 3.5–5.2)
ALP SERPL-CCNC: 60 U/L (ref 55–135)
ALT SERPL W/O P-5'-P-CCNC: 18 U/L (ref 10–44)
ANION GAP SERPL CALC-SCNC: 9 MMOL/L (ref 8–16)
AST SERPL-CCNC: 18 U/L (ref 10–40)
BASOPHILS # BLD AUTO: 0.03 K/UL (ref 0–0.2)
BASOPHILS NFR BLD: 0.6 % (ref 0–1.9)
BILIRUB SERPL-MCNC: 0.5 MG/DL (ref 0.1–1)
BUN SERPL-MCNC: 14 MG/DL (ref 6–20)
CALCIUM SERPL-MCNC: 9.1 MG/DL (ref 8.7–10.5)
CHLORIDE SERPL-SCNC: 105 MMOL/L (ref 95–110)
CHOLEST SERPL-MCNC: 182 MG/DL (ref 120–199)
CHOLEST/HDLC SERPL: 3.2 {RATIO} (ref 2–5)
CO2 SERPL-SCNC: 27 MMOL/L (ref 23–29)
CREAT SERPL-MCNC: 0.8 MG/DL (ref 0.5–1.4)
DIFFERENTIAL METHOD: ABNORMAL
EOSINOPHIL # BLD AUTO: 0.1 K/UL (ref 0–0.5)
EOSINOPHIL NFR BLD: 1.7 % (ref 0–8)
ERYTHROCYTE [DISTWIDTH] IN BLOOD BY AUTOMATED COUNT: 12.1 % (ref 11.5–14.5)
EST. GFR  (AFRICAN AMERICAN): >60 ML/MIN/1.73 M^2
EST. GFR  (NON AFRICAN AMERICAN): >60 ML/MIN/1.73 M^2
GLUCOSE SERPL-MCNC: 97 MG/DL (ref 70–110)
HCT VFR BLD AUTO: 43.7 % (ref 37–48.5)
HDLC SERPL-MCNC: 57 MG/DL (ref 40–75)
HDLC SERPL: 31.3 % (ref 20–50)
HGB BLD-MCNC: 13.8 G/DL (ref 12–16)
IMM GRANULOCYTES # BLD AUTO: 0.01 K/UL (ref 0–0.04)
IMM GRANULOCYTES NFR BLD AUTO: 0.2 % (ref 0–0.5)
LDLC SERPL CALC-MCNC: 95.8 MG/DL (ref 63–159)
LYMPHOCYTES # BLD AUTO: 1.6 K/UL (ref 1–4.8)
LYMPHOCYTES NFR BLD: 31.2 % (ref 18–48)
MCH RBC QN AUTO: 30.2 PG (ref 27–31)
MCHC RBC AUTO-ENTMCNC: 31.6 G/DL (ref 32–36)
MCV RBC AUTO: 96 FL (ref 82–98)
MONOCYTES # BLD AUTO: 0.5 K/UL (ref 0.3–1)
MONOCYTES NFR BLD: 10 % (ref 4–15)
NEUTROPHILS # BLD AUTO: 2.9 K/UL (ref 1.8–7.7)
NEUTROPHILS NFR BLD: 56.3 % (ref 38–73)
NONHDLC SERPL-MCNC: 125 MG/DL
NRBC BLD-RTO: 0 /100 WBC
PLATELET # BLD AUTO: 207 K/UL (ref 150–450)
PMV BLD AUTO: 11.9 FL (ref 9.2–12.9)
POTASSIUM SERPL-SCNC: 4.2 MMOL/L (ref 3.5–5.1)
PROT SERPL-MCNC: 6.7 G/DL (ref 6–8.4)
RBC # BLD AUTO: 4.57 M/UL (ref 4–5.4)
SODIUM SERPL-SCNC: 141 MMOL/L (ref 136–145)
TRIGL SERPL-MCNC: 146 MG/DL (ref 30–150)
TSH SERPL DL<=0.005 MIU/L-ACNC: 2.17 UIU/ML (ref 0.4–4)
WBC # BLD AUTO: 5.2 K/UL (ref 3.9–12.7)

## 2022-02-21 PROCEDURE — 90732 PNEUMOCOCCAL POLYSACCHARIDE VACCINE 23-VALENT =>2YO SQ IM: ICD-10-PCS | Mod: S$GLB,,, | Performed by: FAMILY MEDICINE

## 2022-02-21 PROCEDURE — 85025 COMPLETE CBC W/AUTO DIFF WBC: CPT | Performed by: FAMILY MEDICINE

## 2022-02-21 PROCEDURE — 1159F PR MEDICATION LIST DOCUMENTED IN MEDICAL RECORD: ICD-10-PCS | Mod: CPTII,S$GLB,, | Performed by: FAMILY MEDICINE

## 2022-02-21 PROCEDURE — 36415 COLL VENOUS BLD VENIPUNCTURE: CPT | Mod: PO | Performed by: FAMILY MEDICINE

## 2022-02-21 PROCEDURE — 3008F PR BODY MASS INDEX (BMI) DOCUMENTED: ICD-10-PCS | Mod: CPTII,S$GLB,, | Performed by: FAMILY MEDICINE

## 2022-02-21 PROCEDURE — 99999 PR PBB SHADOW E&M-EST. PATIENT-LVL IV: ICD-10-PCS | Mod: PBBFAC,,, | Performed by: FAMILY MEDICINE

## 2022-02-21 PROCEDURE — 80061 LIPID PANEL: CPT | Performed by: FAMILY MEDICINE

## 2022-02-21 PROCEDURE — 3074F PR MOST RECENT SYSTOLIC BLOOD PRESSURE < 130 MM HG: ICD-10-PCS | Mod: CPTII,S$GLB,, | Performed by: FAMILY MEDICINE

## 2022-02-21 PROCEDURE — 90471 IMMUNIZATION ADMIN: CPT | Mod: S$GLB,,, | Performed by: FAMILY MEDICINE

## 2022-02-21 PROCEDURE — 99396 PREV VISIT EST AGE 40-64: CPT | Mod: 25,S$GLB,, | Performed by: FAMILY MEDICINE

## 2022-02-21 PROCEDURE — 80053 COMPREHEN METABOLIC PANEL: CPT | Performed by: FAMILY MEDICINE

## 2022-02-21 PROCEDURE — 99396 PR PREVENTIVE VISIT,EST,40-64: ICD-10-PCS | Mod: 25,S$GLB,, | Performed by: FAMILY MEDICINE

## 2022-02-21 PROCEDURE — 3008F BODY MASS INDEX DOCD: CPT | Mod: CPTII,S$GLB,, | Performed by: FAMILY MEDICINE

## 2022-02-21 PROCEDURE — 84443 ASSAY THYROID STIM HORMONE: CPT | Performed by: FAMILY MEDICINE

## 2022-02-21 PROCEDURE — 3074F SYST BP LT 130 MM HG: CPT | Mod: CPTII,S$GLB,, | Performed by: FAMILY MEDICINE

## 2022-02-21 PROCEDURE — 1159F MED LIST DOCD IN RCRD: CPT | Mod: CPTII,S$GLB,, | Performed by: FAMILY MEDICINE

## 2022-02-21 PROCEDURE — 99999 PR PBB SHADOW E&M-EST. PATIENT-LVL IV: CPT | Mod: PBBFAC,,, | Performed by: FAMILY MEDICINE

## 2022-02-21 PROCEDURE — 3078F DIAST BP <80 MM HG: CPT | Mod: CPTII,S$GLB,, | Performed by: FAMILY MEDICINE

## 2022-02-21 PROCEDURE — 90732 PPSV23 VACC 2 YRS+ SUBQ/IM: CPT | Mod: S$GLB,,, | Performed by: FAMILY MEDICINE

## 2022-02-21 PROCEDURE — 90471 PNEUMOCOCCAL POLYSACCHARIDE VACCINE 23-VALENT =>2YO SQ IM: ICD-10-PCS | Mod: S$GLB,,, | Performed by: FAMILY MEDICINE

## 2022-02-21 PROCEDURE — 3078F PR MOST RECENT DIASTOLIC BLOOD PRESSURE < 80 MM HG: ICD-10-PCS | Mod: CPTII,S$GLB,, | Performed by: FAMILY MEDICINE

## 2022-02-21 RX ORDER — CHOLECALCIFEROL (VITAMIN D3) 25 MCG
2000 TABLET ORAL DAILY
COMMUNITY

## 2022-02-21 RX ORDER — IBUPROFEN 100 MG/5ML
1000 SUSPENSION, ORAL (FINAL DOSE FORM) ORAL DAILY
COMMUNITY
End: 2024-03-26

## 2022-02-21 NOTE — PROGRESS NOTES
Subjective:       Patient ID: Zahida Dodd is a 62 y.o. female.    Chief Complaint: Annual Exam    HPI     The patient is coming here today for an annual examination.  The patient stated that she is losing more weight, she is trying to eat healthier, exercise, the patient is taking also fish oil daily for her cholesterol and pravastatin, denies any side effects of the medication, she is currently not taking any supplements, she also needs a mammogram and her COVID vaccine booster.  She was diagnosed with fatty liver disease, her liver enzymes were in range the last time.    Past medical history, past social history was reviewed and discussed with the patient.    Review of Systems   Constitutional: Negative for activity change, appetite change and chills.   HENT: Negative for congestion and ear discharge.    Eyes: Negative for discharge and itching.   Respiratory: Negative for choking and chest tightness.    Cardiovascular: Negative for chest pain, palpitations and leg swelling.   Gastrointestinal: Negative for abdominal distention, abdominal pain and constipation.   Endocrine: Negative for cold intolerance and heat intolerance.   Genitourinary: Negative for dysuria and flank pain.   Musculoskeletal: Negative for arthralgias and back pain.   Skin: Negative for pallor and rash.   Allergic/Immunologic: Negative for environmental allergies and food allergies.   Neurological: Negative for dizziness, facial asymmetry and headaches.   Hematological: Negative for adenopathy. Does not bruise/bleed easily.   Psychiatric/Behavioral: Negative for agitation, confusion, decreased concentration and sleep disturbance.       Objective:      Physical Exam  Vitals and nursing note reviewed.   Constitutional:       General: She is not in acute distress.     Appearance: Normal appearance. She is well-developed. She is obese. She is not diaphoretic.   HENT:      Head: Normocephalic and atraumatic.      Right Ear: External ear  normal.      Left Ear: External ear normal.      Nose: Nose normal.      Mouth/Throat:      Pharynx: No oropharyngeal exudate.   Eyes:      General: No scleral icterus.        Right eye: No discharge.         Left eye: No discharge.      Conjunctiva/sclera: Conjunctivae normal.      Pupils: Pupils are equal, round, and reactive to light.   Cardiovascular:      Rate and Rhythm: Normal rate and regular rhythm.      Heart sounds: Normal heart sounds. No murmur heard.  Pulmonary:      Effort: Pulmonary effort is normal. No respiratory distress.      Breath sounds: Normal breath sounds. No wheezing.   Abdominal:      General: Abdomen is flat. Bowel sounds are normal. There is no distension.      Palpations: Abdomen is soft. There is no mass.      Tenderness: There is no abdominal tenderness.   Musculoskeletal:         General: No tenderness or deformity.      Cervical back: Neck supple.   Skin:     Coloration: Skin is not pale.   Neurological:      Mental Status: She is alert.      Cranial Nerves: No cranial nerve deficit.   Psychiatric:         Behavior: Behavior normal.         Thought Content: Thought content normal.         Judgment: Judgment normal.         Assessment:       1. Annual visit for general adult medical examination with abnormal findings    2. Encounter for screening mammogram for malignant neoplasm of breast    3. Keratitis sicca, bilateral        Plan:       Annual visit for general adult medical examination with abnormal findings  -     CBC Auto Differential; Future; Expected date: 02/21/2022  -     Comprehensive Metabolic Panel; Future; Expected date: 02/21/2022  -     Lipid Panel; Future; Expected date: 02/21/2022  -     TSH; Future; Expected date: 02/21/2022  -     (In Office Administered) Pneumococcal Polysaccharide Vaccine (23 Valent) (SQ/IM)    Encounter for screening mammogram for malignant neoplasm of breast  -     Mammo Digital Screening Bilat; Future; Expected date: 02/21/2022    Keratitis  sicca, bilateral:  Stable      Continue healthy habits, continue exercising.  Pneumonia vaccine was given in the clinic.  The patient's BMI has been recorded in the chart. The patient has been provided educational materials regarding the benefits of attaining and maintaining a normal weight. We will continue to address and follow this issue during follow up visits.   Patient agreed with assessment and plan. Patient verbalized understanding.

## 2022-02-22 ENCOUNTER — PATIENT MESSAGE (OUTPATIENT)
Dept: FAMILY MEDICINE | Facility: CLINIC | Age: 62
End: 2022-02-22
Payer: COMMERCIAL

## 2022-02-22 DIAGNOSIS — L98.9 SKIN LESION: Primary | ICD-10-CM

## 2022-02-23 NOTE — TELEPHONE ENCOUNTER
"Pt states requesting a dermatology referral. She states,  "  I forgot to tell you that I need to see a dermatologist to check out some moles on my face and shoulder. Can you make an appointment with a dermatologist please?"  Order pended please advise  "

## 2022-03-03 ENCOUNTER — IMMUNIZATION (OUTPATIENT)
Dept: FAMILY MEDICINE | Facility: CLINIC | Age: 62
End: 2022-03-03
Payer: COMMERCIAL

## 2022-03-03 ENCOUNTER — HOSPITAL ENCOUNTER (OUTPATIENT)
Dept: RADIOLOGY | Facility: HOSPITAL | Age: 62
Discharge: HOME | End: 2022-03-03
Attending: FAMILY MEDICINE
Payer: COMMERCIAL

## 2022-03-03 DIAGNOSIS — Z23 NEED FOR VACCINATION: Primary | ICD-10-CM

## 2022-03-03 DIAGNOSIS — Z12.31 ENCOUNTER FOR SCREENING MAMMOGRAM FOR MALIGNANT NEOPLASM OF BREAST: ICD-10-CM

## 2022-03-03 PROCEDURE — 91305 COVID-19, MRNA, LNP-S, PF, 30 MCG/0.3 ML DOSE VACCINE (PFIZER): ICD-10-PCS | Mod: S$GLB,,, | Performed by: FAMILY MEDICINE

## 2022-03-03 PROCEDURE — 77063 BREAST TOMOSYNTHESIS BI: CPT | Mod: 26,,, | Performed by: RADIOLOGY

## 2022-03-03 PROCEDURE — 77067 SCR MAMMO BI INCL CAD: CPT | Mod: 26,,, | Performed by: RADIOLOGY

## 2022-03-03 PROCEDURE — 77067 SCR MAMMO BI INCL CAD: CPT | Mod: TC,PO

## 2022-03-03 PROCEDURE — 77067 MAMMO DIGITAL SCREENING BILAT WITH TOMO: ICD-10-PCS | Mod: 26,,, | Performed by: RADIOLOGY

## 2022-03-03 PROCEDURE — 77063 MAMMO DIGITAL SCREENING BILAT WITH TOMO: ICD-10-PCS | Mod: 26,,, | Performed by: RADIOLOGY

## 2022-03-03 PROCEDURE — 91305 COVID-19, MRNA, LNP-S, PF, 30 MCG/0.3 ML DOSE VACCINE (PFIZER): CPT | Mod: S$GLB,,, | Performed by: FAMILY MEDICINE

## 2022-03-03 PROCEDURE — 77063 BREAST TOMOSYNTHESIS BI: CPT | Mod: TC,PO

## 2022-05-09 ENCOUNTER — PATIENT MESSAGE (OUTPATIENT)
Dept: SMOKING CESSATION | Facility: CLINIC | Age: 62
End: 2022-05-09
Payer: COMMERCIAL

## 2022-05-17 ENCOUNTER — TELEPHONE (OUTPATIENT)
Dept: DERMATOLOGY | Facility: CLINIC | Age: 62
End: 2022-05-17
Payer: COMMERCIAL

## 2022-05-18 ENCOUNTER — TELEPHONE (OUTPATIENT)
Dept: DERMATOLOGY | Facility: CLINIC | Age: 62
End: 2022-05-18
Payer: COMMERCIAL

## 2022-05-18 NOTE — TELEPHONE ENCOUNTER
Called patient to schedule Dermatology appointment.  Left voicemail message to call [phone].   37 year old man with PMH of Parkinson's s/p theta burst stimulation last month and arthritis presents in ED for 2 day history of fever associated with cough with yellowish sputum.  As per chart, patient's family took temperature at home which was 103 F.  Patient states that he was in his usual state of health until he felt like he had a "bad cold" which has gotten progressively worse.    In the ED, prelim read of CXR is RLL PNA, CBC shows left shift, no leukocytosis, lactate normal.  Rapid flu neg.

## 2022-05-19 ENCOUNTER — TELEPHONE (OUTPATIENT)
Dept: DERMATOLOGY | Facility: CLINIC | Age: 62
End: 2022-05-19
Payer: COMMERCIAL

## 2022-05-19 NOTE — TELEPHONE ENCOUNTER
Called patient to schedule Dermatology appointment.  Left voicemail message to call [phone].  3rd attempt, unable to contact.

## 2022-07-21 ENCOUNTER — TELEPHONE (OUTPATIENT)
Dept: FAMILY MEDICINE | Facility: CLINIC | Age: 62
End: 2022-07-21
Payer: COMMERCIAL

## 2022-07-21 ENCOUNTER — PATIENT MESSAGE (OUTPATIENT)
Dept: FAMILY MEDICINE | Facility: CLINIC | Age: 62
End: 2022-07-21
Payer: COMMERCIAL

## 2022-07-21 NOTE — TELEPHONE ENCOUNTER
----- Message from Francis Machado sent at 7/21/2022 11:45 AM CDT -----  Contact: pt at [phone]  Type: Needs Medical Advice  Who Called:  pt   Symptoms (please be specific):  Covid Symptoms   Pharmacy name and phone #:    [facility] [address] [facility] [place] [facility] [address]  [address]  [place] [address]  Phone: [phone] Fax: [phone]    Best Call Back Number: [phone]    Additional Information: pt would like a call back her symptoms are not good and she wants medicine sent over for her to the [facility] near her

## 2022-07-22 ENCOUNTER — OFFICE VISIT (OUTPATIENT)
Dept: FAMILY MEDICINE | Facility: CLINIC | Age: 62
End: 2022-07-22
Payer: COMMERCIAL

## 2022-07-22 DIAGNOSIS — U07.1 COVID-19: Primary | ICD-10-CM

## 2022-07-22 PROCEDURE — 1160F PR REVIEW ALL MEDS BY PRESCRIBER/CLIN PHARMACIST DOCUMENTED: ICD-10-PCS | Mod: CPTII,95,, | Performed by: FAMILY MEDICINE

## 2022-07-22 PROCEDURE — 99213 PR OFFICE/OUTPT VISIT, EST, LEVL III, 20-29 MIN: ICD-10-PCS | Mod: 95,,, | Performed by: FAMILY MEDICINE

## 2022-07-22 PROCEDURE — 1159F PR MEDICATION LIST DOCUMENTED IN MEDICAL RECORD: ICD-10-PCS | Mod: CPTII,95,, | Performed by: FAMILY MEDICINE

## 2022-07-22 PROCEDURE — 1159F MED LIST DOCD IN RCRD: CPT | Mod: CPTII,95,, | Performed by: FAMILY MEDICINE

## 2022-07-22 PROCEDURE — 1160F RVW MEDS BY RX/DR IN RCRD: CPT | Mod: CPTII,95,, | Performed by: FAMILY MEDICINE

## 2022-07-22 PROCEDURE — 99213 OFFICE O/P EST LOW 20 MIN: CPT | Mod: 95,,, | Performed by: FAMILY MEDICINE

## 2022-07-22 RX ORDER — MONTELUKAST SODIUM 10 MG/1
10 TABLET ORAL NIGHTLY
Qty: 30 TABLET | Refills: 0 | Status: SHIPPED | OUTPATIENT
Start: 2022-07-22 | End: 2022-08-21

## 2022-08-07 NOTE — PROGRESS NOTES
Assessment:       1. COVID-19        Plan:       COVID-19:  New problem, no further workup needed  -     nirmatrelvir-ritonavir 300 mg (150 mg x 2)-100 mg copackaged tablets (EUA); Take 3 tablets by mouth 2 (two) times daily for 5 days. Each dose contains 2 nirmatrelvir (pink tablets) and 1 ritonavir (white tablet). Take all 3 tablets together  Dispense: 30 tablet; Refill: 0  -     montelukast (SINGULAIR) 10 mg tablet; Take 1 tablet (10 mg total) by mouth every evening.  Dispense: 30 tablet; Refill: 0    Will start the patient on Paxlovid, the patient was advised to eat healthy, avoid processed foods, processed starches, drink more water, take vitamin-C, take vitamin-D, takes zinc, will start patient also on montelukast, if the symptoms get worse, the patient notify us immediately.   Patient agreed with assessment and plan. Patient verbalized understanding.     Subjective:       Patient ID: Zahida Dodd is a 62 y.o. female.    Chief Complaint: COVID-19 Concerns    HPI     The patient location is:  [place]  The chief complaint leading to consultation is:  COVID-19    Visit type: audiovisual    Face to Face time with patient: 10 minutes  15 minutes of total time spent on the encounter, which includes face to face time and non-face to face time preparing to see the patient (eg, review of tests), Obtaining and/or reviewing separately obtained history, Documenting clinical information in the electronic or other health record, Independently interpreting results (not separately reported) and communicating results to the patient/family/caregiver, or Care coordination (not separately reported).         Each patient to whom he or she provides medical services by telemedicine is:  (1) informed of the relationship between the physician and patient and the respective role of any other health care provider with respect to management of the patient; and (2) notified that he or she may decline to receive medical  services by telemedicine and may withdraw from such care at any time.    Notes:     The patient stated that was diagnosed with COVID-19, the patient stated the symptoms started less than 5 days ago with congestion, postnasal drip, cough, chills, fever, myalgias, the symptoms are getting worse.  The patient is here for assessment.    Past medical history, past social history was reviewed and discussed with the patient.    Review of Systems   Constitutional: Positive for chills and fever. Negative for activity change and appetite change.   HENT: Positive for rhinorrhea. Negative for congestion and ear discharge.    Eyes: Negative for discharge and itching.   Respiratory: Positive for cough. Negative for choking, chest tightness and shortness of breath.    Cardiovascular: Negative for chest pain, palpitations and leg swelling.   Gastrointestinal: Negative for abdominal distention, abdominal pain and constipation.   Endocrine: Negative for cold intolerance and heat intolerance.   Genitourinary: Negative for dysuria and flank pain.   Musculoskeletal: Positive for myalgias. Negative for arthralgias and back pain.   Skin: Negative for pallor and rash.   Allergic/Immunologic: Negative for environmental allergies and food allergies.   Neurological: Positive for headaches. Negative for dizziness and facial asymmetry.   Hematological: Negative for adenopathy. Does not bruise/bleed easily.   Psychiatric/Behavioral: Negative for agitation, confusion, decreased concentration and sleep disturbance.       Objective:      Physical Exam  Constitutional:       General: She is not in acute distress.     Appearance: Normal appearance. She is not ill-appearing or toxic-appearing.   HENT:      Head: Normocephalic and atraumatic.   Neurological:      Mental Status: She is alert.   Psychiatric:         Mood and Affect: Mood normal.         Behavior: Behavior normal.         Thought Content: Thought content normal.         Judgment: Judgment  normal.

## 2022-10-20 ENCOUNTER — TELEPHONE (OUTPATIENT)
Dept: FAMILY MEDICINE | Facility: CLINIC | Age: 62
End: 2022-10-20

## 2022-10-20 NOTE — TELEPHONE ENCOUNTER
----- Message from Shabnam Hawthorne sent at 10/20/2022 10:05 AM CDT -----  Contact: Fela  Type: Patient Call Back         Who called: Patient         What is the request in detail: calling to resched missed 6 months appt; states she prefers earliest avail appt in the morning; please          Can the clinic reply by [facility]? [email or web address]         Would the patient rather a call back or a response via [facility]? both         Best call back number: [phone]         Additional Information:            Thank You

## 2022-10-31 ENCOUNTER — OFFICE VISIT (OUTPATIENT)
Dept: FAMILY MEDICINE | Facility: CLINIC | Age: 62
End: 2022-10-31
Payer: COMMERCIAL

## 2022-10-31 VITALS
SYSTOLIC BLOOD PRESSURE: 104 MMHG | OXYGEN SATURATION: 99 % | DIASTOLIC BLOOD PRESSURE: 74 MMHG | HEART RATE: 78 BPM | WEIGHT: 211.63 LBS | BODY MASS INDEX: 34.16 KG/M2

## 2022-10-31 DIAGNOSIS — G62.9 NEUROPATHY: ICD-10-CM

## 2022-10-31 DIAGNOSIS — Z23 NEED FOR VACCINATION: ICD-10-CM

## 2022-10-31 DIAGNOSIS — F41.9 ANXIETY: Primary | ICD-10-CM

## 2022-10-31 DIAGNOSIS — E66.09 CLASS 1 OBESITY DUE TO EXCESS CALORIES WITHOUT SERIOUS COMORBIDITY WITH BODY MASS INDEX (BMI) OF 34.0 TO 34.9 IN ADULT: ICD-10-CM

## 2022-10-31 DIAGNOSIS — G47.09 OTHER INSOMNIA: ICD-10-CM

## 2022-10-31 DIAGNOSIS — R07.89 OTHER CHEST PAIN: ICD-10-CM

## 2022-10-31 PROCEDURE — 99999 PR PBB SHADOW E&M-EST. PATIENT-LVL IV: CPT | Mod: PBBFAC,,, | Performed by: FAMILY MEDICINE

## 2022-10-31 PROCEDURE — 99214 OFFICE O/P EST MOD 30 MIN: CPT | Mod: 25,S$GLB,, | Performed by: FAMILY MEDICINE

## 2022-10-31 PROCEDURE — 1159F MED LIST DOCD IN RCRD: CPT | Mod: CPTII,S$GLB,, | Performed by: FAMILY MEDICINE

## 2022-10-31 PROCEDURE — 91312 COVID-19, MRNA, LNP-S, BIVALENT BOOSTER, PF, 30 MCG/0.3 ML DOSE: CPT | Mod: S$GLB,,, | Performed by: FAMILY MEDICINE

## 2022-10-31 PROCEDURE — 3074F PR MOST RECENT SYSTOLIC BLOOD PRESSURE < 130 MM HG: ICD-10-PCS | Mod: CPTII,S$GLB,, | Performed by: FAMILY MEDICINE

## 2022-10-31 PROCEDURE — 1160F RVW MEDS BY RX/DR IN RCRD: CPT | Mod: CPTII,S$GLB,, | Performed by: FAMILY MEDICINE

## 2022-10-31 PROCEDURE — 99999 PR PBB SHADOW E&M-EST. PATIENT-LVL IV: ICD-10-PCS | Mod: PBBFAC,,, | Performed by: FAMILY MEDICINE

## 2022-10-31 PROCEDURE — 3078F PR MOST RECENT DIASTOLIC BLOOD PRESSURE < 80 MM HG: ICD-10-PCS | Mod: CPTII,S$GLB,, | Performed by: FAMILY MEDICINE

## 2022-10-31 PROCEDURE — 90686 FLU VACCINE (QUAD) GREATER THAN OR EQUAL TO 3YO PRESERVATIVE FREE IM: ICD-10-PCS | Mod: S$GLB,,, | Performed by: FAMILY MEDICINE

## 2022-10-31 PROCEDURE — 99214 PR OFFICE/OUTPT VISIT, EST, LEVL IV, 30-39 MIN: ICD-10-PCS | Mod: 25,S$GLB,, | Performed by: FAMILY MEDICINE

## 2022-10-31 PROCEDURE — 0124A COVID-19, MRNA, LNP-S, BIVALENT BOOSTER, PF, 30 MCG/0.3 ML DOSE: CPT | Mod: PBBFAC | Performed by: FAMILY MEDICINE

## 2022-10-31 PROCEDURE — 3078F DIAST BP <80 MM HG: CPT | Mod: CPTII,S$GLB,, | Performed by: FAMILY MEDICINE

## 2022-10-31 PROCEDURE — 90471 IMMUNIZATION ADMIN: CPT | Mod: S$GLB,,, | Performed by: FAMILY MEDICINE

## 2022-10-31 PROCEDURE — 90686 IIV4 VACC NO PRSV 0.5 ML IM: CPT | Mod: S$GLB,,, | Performed by: FAMILY MEDICINE

## 2022-10-31 PROCEDURE — 1160F PR REVIEW ALL MEDS BY PRESCRIBER/CLIN PHARMACIST DOCUMENTED: ICD-10-PCS | Mod: CPTII,S$GLB,, | Performed by: FAMILY MEDICINE

## 2022-10-31 PROCEDURE — 3074F SYST BP LT 130 MM HG: CPT | Mod: CPTII,S$GLB,, | Performed by: FAMILY MEDICINE

## 2022-10-31 PROCEDURE — 90471 FLU VACCINE (QUAD) GREATER THAN OR EQUAL TO 3YO PRESERVATIVE FREE IM: ICD-10-PCS | Mod: S$GLB,,, | Performed by: FAMILY MEDICINE

## 2022-10-31 PROCEDURE — 91312 COVID-19, MRNA, LNP-S, BIVALENT BOOSTER, PF, 30 MCG/0.3 ML DOSE: ICD-10-PCS | Mod: S$GLB,,, | Performed by: FAMILY MEDICINE

## 2022-10-31 PROCEDURE — 1159F PR MEDICATION LIST DOCUMENTED IN MEDICAL RECORD: ICD-10-PCS | Mod: CPTII,S$GLB,, | Performed by: FAMILY MEDICINE

## 2022-10-31 RX ORDER — ALCOHOL 2.38 KG/3.79L
1 GEL TOPICAL 2 TIMES DAILY
Qty: 60 CAPSULE | Refills: 5 | Status: SHIPPED | OUTPATIENT
Start: 2022-10-31 | End: 2022-10-31

## 2022-10-31 RX ORDER — ALCOHOL 2.38 KG/3.79L
1 GEL TOPICAL 2 TIMES DAILY
Qty: 180 CAPSULE | Refills: 3 | Status: SHIPPED | OUTPATIENT
Start: 2022-10-31

## 2022-10-31 RX ORDER — TIRZEPATIDE 2.5 MG/.5ML
2.5 INJECTION, SOLUTION SUBCUTANEOUS
Qty: 12 PEN | Refills: 0 | Status: SHIPPED | OUTPATIENT
Start: 2022-10-31 | End: 2023-01-13 | Stop reason: SDUPTHER

## 2022-10-31 RX ORDER — CALCIUM CARB/MAGNESIUM CARB 311-232MG
1 TABLET ORAL NIGHTLY
COMMUNITY
End: 2024-03-26 | Stop reason: ALTCHOICE

## 2022-10-31 RX ORDER — ESCITALOPRAM OXALATE 5 MG/1
5 TABLET ORAL NIGHTLY
Qty: 30 TABLET | Refills: 11 | Status: SHIPPED | OUTPATIENT
Start: 2022-10-31 | End: 2023-09-14

## 2022-10-31 RX ORDER — ALCOHOL 2.38 KG/3.79L
1 GEL TOPICAL 2 TIMES DAILY
Qty: 180 CAPSULE | Refills: 3 | Status: SHIPPED | OUTPATIENT
Start: 2022-10-31 | End: 2022-10-31 | Stop reason: SDUPTHER

## 2022-10-31 NOTE — PROGRESS NOTES
Assessment:       1. Anxiety    2. Other chest pain    3. Class 1 obesity due to excess calories without serious comorbidity with body mass index (BMI) of 34.0 to 34.9 in adult    4. Other insomnia        5. Neuropathy  Plan:       Anxiety:  Worsening  -     EScitalopram oxalate (LEXAPRO) 5 MG Tab; Take 1 tablet (5 mg total) by mouth nightly.  Dispense: 30 tablet; Refill: 11      Chest tightness:  New problem, next visit workup    Class 1 obesity due to excess calories without serious comorbidity with body mass index (BMI) of 34.0 to 34.9 in adult:  Worsening  -     tirzepatide (MOUNJARO) 2.5 mg/0.5 mL PnIj; Inject 2.5 mg into the skin every 7 days.  Dispense: 12 pen; Refill: 0    Other insomnia:  Worsening        Neuropathy:  Worsening    Will start patient on Lexapro 5 mg at bedtime.  If the symptoms of chest tightness persist, the patient notify us immediately and will order EKG.  Will start patient on Mounjaro 2.5 mg every week, weight loss, increase physical activity as tolerated.  Or insomnia the patient was recommended to take over-the-counter melatonin.  Will start patient on Metanx secondary to lower back pain with neuropathy symptoms.  Will recheck blood work at her next office visit in 3 months.  The patient's BMI has been recorded in the chart. The patient has been provided educational materials regarding the benefits of attaining and maintaining a normal weight. We will continue to address and follow this issue during follow up visits.   Patient agreed with assessment and plan. Patient verbalized understanding.     Subjective:       Patient ID: Zahida Dodd is a 62 y.o. female.    Chief Complaint: Follow-up (Pt is having lower back pain and anxiety started after having covid in july)    HPI    Back pain:  The patient complains of back pain localized on the lower back with radiation on the lower extremities and numbness and tingling sensation.  The patient would like to try Metanx as she try  physical therapy before and did not work.    Anxiety:  The patient complains of multiple episodes of anxiety, sometimes she feels her chest feels tight when she has these episodes, she would like to try something for this problem, denies any shortness of breath, diaphoresis associated with, the patient denies any symptoms with exertion.    Obesity:  Complains worsening weight problems, not able to lose, she would like to try medication to help her lose weight.    Insomnia:  Complains worsening symptoms of insomnia, she would like to know what she can try for this, the patient stated she is not sleeping at all at night.  The patient also stated that she has been experience problems with memory loss and her mom was diagnosed with dementia before.    Past medical history, past social history was reviewed and discussed with the patient.    Review of Systems   Constitutional:  Negative for activity change and appetite change.   HENT:  Negative for ear discharge.    Eyes:  Negative for discharge and itching.   Respiratory:  Negative for choking and chest tightness.    Cardiovascular:  Negative for leg swelling.   Gastrointestinal:  Negative for abdominal distention and constipation.   Endocrine: Negative for cold intolerance and heat intolerance.   Genitourinary:  Negative for dysuria and flank pain.   Musculoskeletal:  Positive for back pain. Negative for arthralgias.   Skin:  Negative for color change and pallor.   Allergic/Immunologic: Negative for environmental allergies and food allergies.   Neurological:  Positive for numbness. Negative for dizziness and facial asymmetry.   Hematological:  Negative for adenopathy. Does not bruise/bleed easily.   Psychiatric/Behavioral:  Positive for decreased concentration and sleep disturbance. Negative for agitation and confusion.      Objective:      Physical Exam  Vitals and nursing note reviewed.   Constitutional:       General: She is not in acute distress.     Appearance:  Normal appearance. She is well-developed. She is obese. She is not diaphoretic.   HENT:      Head: Normocephalic and atraumatic.      Right Ear: External ear normal.      Left Ear: External ear normal.      Nose: Nose normal.   Eyes:      General: No scleral icterus.        Right eye: No discharge.         Left eye: No discharge.      Conjunctiva/sclera: Conjunctivae normal.   Cardiovascular:      Rate and Rhythm: Normal rate and regular rhythm.      Heart sounds: Normal heart sounds.   Pulmonary:      Effort: Pulmonary effort is normal. No respiratory distress.      Breath sounds: Normal breath sounds. No wheezing.   Musculoskeletal:         General: No tenderness or deformity.      Cervical back: Neck supple.   Skin:     Coloration: Skin is not pale.   Neurological:      Mental Status: She is alert.   Psychiatric:         Behavior: Behavior normal.         Thought Content: Thought content normal.         Judgment: Judgment normal.

## 2023-01-13 DIAGNOSIS — E66.09 CLASS 1 OBESITY DUE TO EXCESS CALORIES WITHOUT SERIOUS COMORBIDITY WITH BODY MASS INDEX (BMI) OF 34.0 TO 34.9 IN ADULT: ICD-10-CM

## 2023-01-13 RX ORDER — TIRZEPATIDE 2.5 MG/.5ML
2.5 INJECTION, SOLUTION SUBCUTANEOUS
Qty: 12 PEN | Refills: 0 | Status: SHIPPED | OUTPATIENT
Start: 2023-01-13 | End: 2023-02-03 | Stop reason: DRUGHIGH

## 2023-01-13 NOTE — TELEPHONE ENCOUNTER
Refill Routing Note   Medication(s) are not appropriate for processing by [facility] for the following reason(s):      - Required laboratory values are outdated    [facility] action(s):  Defer          Medication reconciliation completed: No     Appointments  past 12m or future 3m with PCP    Date Provider   Last Visit   10/31/2022 Mary Lopez MD   Next Visit   1/31/2023 Mary Lopez MD   ED visits in past 90 days: 0        Note composed:8:26 AM 01/13/2023

## 2023-01-13 NOTE — TELEPHONE ENCOUNTER
No new care gaps identified.  [facility] Embedded Care Gaps. Reference number: [id number]. 1/13/2023   7:59:04 AM CST

## 2023-02-03 ENCOUNTER — PATIENT MESSAGE (OUTPATIENT)
Dept: FAMILY MEDICINE | Facility: CLINIC | Age: 63
End: 2023-02-03

## 2023-02-03 ENCOUNTER — LAB VISIT (OUTPATIENT)
Dept: LAB | Facility: HOSPITAL | Age: 63
End: 2023-02-03
Attending: FAMILY MEDICINE
Payer: COMMERCIAL

## 2023-02-03 ENCOUNTER — OFFICE VISIT (OUTPATIENT)
Dept: FAMILY MEDICINE | Facility: CLINIC | Age: 63
End: 2023-02-03
Payer: COMMERCIAL

## 2023-02-03 ENCOUNTER — TELEPHONE (OUTPATIENT)
Dept: FAMILY MEDICINE | Facility: CLINIC | Age: 63
End: 2023-02-03

## 2023-02-03 VITALS
BODY MASS INDEX: 31.82 KG/M2 | HEIGHT: 66 IN | OXYGEN SATURATION: 99 % | WEIGHT: 198 LBS | SYSTOLIC BLOOD PRESSURE: 112 MMHG | DIASTOLIC BLOOD PRESSURE: 78 MMHG | HEART RATE: 74 BPM

## 2023-02-03 DIAGNOSIS — Z00.01 ANNUAL VISIT FOR GENERAL ADULT MEDICAL EXAMINATION WITH ABNORMAL FINDINGS: Primary | ICD-10-CM

## 2023-02-03 DIAGNOSIS — R20.0 NUMBNESS AND TINGLING: ICD-10-CM

## 2023-02-03 DIAGNOSIS — Z13.1 SCREENING FOR DIABETES MELLITUS: ICD-10-CM

## 2023-02-03 DIAGNOSIS — Z12.31 ENCOUNTER FOR SCREENING MAMMOGRAM FOR MALIGNANT NEOPLASM OF BREAST: ICD-10-CM

## 2023-02-03 DIAGNOSIS — R20.2 NUMBNESS AND TINGLING: ICD-10-CM

## 2023-02-03 DIAGNOSIS — Z00.01 ANNUAL VISIT FOR GENERAL ADULT MEDICAL EXAMINATION WITH ABNORMAL FINDINGS: ICD-10-CM

## 2023-02-03 DIAGNOSIS — M35.01 KERATITIS SICCA, BILATERAL: ICD-10-CM

## 2023-02-03 DIAGNOSIS — E66.09 CLASS 1 OBESITY DUE TO EXCESS CALORIES WITHOUT SERIOUS COMORBIDITY WITH BODY MASS INDEX (BMI) OF 31.0 TO 31.9 IN ADULT: ICD-10-CM

## 2023-02-03 PROBLEM — E66.811 CLASS 1 OBESITY DUE TO EXCESS CALORIES WITHOUT SERIOUS COMORBIDITY WITH BODY MASS INDEX (BMI) OF 31.0 TO 31.9 IN ADULT: Status: ACTIVE | Noted: 2023-02-03

## 2023-02-03 LAB
25(OH)D3+25(OH)D2 SERPL-MCNC: 34 NG/ML (ref 30–96)
ALBUMIN SERPL BCP-MCNC: 4 G/DL (ref 3.5–5.2)
ALP SERPL-CCNC: 51 U/L (ref 55–135)
ALT SERPL W/O P-5'-P-CCNC: 17 U/L (ref 10–44)
ANION GAP SERPL CALC-SCNC: 11 MMOL/L (ref 8–16)
AST SERPL-CCNC: 21 U/L (ref 10–40)
BASOPHILS # BLD AUTO: 0.04 K/UL (ref 0–0.2)
BASOPHILS NFR BLD: 0.7 % (ref 0–1.9)
BILIRUB SERPL-MCNC: 0.6 MG/DL (ref 0.1–1)
BUN SERPL-MCNC: 13 MG/DL (ref 8–23)
CALCIUM SERPL-MCNC: 9.2 MG/DL (ref 8.7–10.5)
CHLORIDE SERPL-SCNC: 108 MMOL/L (ref 95–110)
CHOLEST SERPL-MCNC: 234 MG/DL (ref 120–199)
CHOLEST/HDLC SERPL: 3.7 {RATIO} (ref 2–5)
CO2 SERPL-SCNC: 24 MMOL/L (ref 23–29)
CREAT SERPL-MCNC: 0.9 MG/DL (ref 0.5–1.4)
DIFFERENTIAL METHOD: NORMAL
EOSINOPHIL # BLD AUTO: 0.1 K/UL (ref 0–0.5)
EOSINOPHIL NFR BLD: 1.1 % (ref 0–8)
ERYTHROCYTE [DISTWIDTH] IN BLOOD BY AUTOMATED COUNT: 12.7 % (ref 11.5–14.5)
EST. GFR  (NO RACE VARIABLE): >60 ML/MIN/1.73 M^2
ESTIMATED AVG GLUCOSE: 100 MG/DL (ref 68–131)
FOLATE SERPL-MCNC: 35 NG/ML (ref 4–24)
GLUCOSE SERPL-MCNC: 84 MG/DL (ref 70–110)
HBA1C MFR BLD: 5.1 % (ref 4–5.6)
HCT VFR BLD AUTO: 42.4 % (ref 37–48.5)
HDLC SERPL-MCNC: 64 MG/DL (ref 40–75)
HDLC SERPL: 27.4 % (ref 20–50)
HGB BLD-MCNC: 13.8 G/DL (ref 12–16)
IMM GRANULOCYTES # BLD AUTO: 0 K/UL (ref 0–0.04)
IMM GRANULOCYTES NFR BLD AUTO: 0 % (ref 0–0.5)
LDLC SERPL CALC-MCNC: 149.2 MG/DL (ref 63–159)
LYMPHOCYTES # BLD AUTO: 1.6 K/UL (ref 1–4.8)
LYMPHOCYTES NFR BLD: 29.2 % (ref 18–48)
MCH RBC QN AUTO: 29.7 PG (ref 27–31)
MCHC RBC AUTO-ENTMCNC: 32.5 G/DL (ref 32–36)
MCV RBC AUTO: 91 FL (ref 82–98)
MONOCYTES # BLD AUTO: 0.6 K/UL (ref 0.3–1)
MONOCYTES NFR BLD: 10.2 % (ref 4–15)
NEUTROPHILS # BLD AUTO: 3.2 K/UL (ref 1.8–7.7)
NEUTROPHILS NFR BLD: 58.8 % (ref 38–73)
NONHDLC SERPL-MCNC: 170 MG/DL
NRBC BLD-RTO: 0 /100 WBC
PLATELET # BLD AUTO: 225 K/UL (ref 150–450)
PMV BLD AUTO: 12.1 FL (ref 9.2–12.9)
POTASSIUM SERPL-SCNC: 3.9 MMOL/L (ref 3.5–5.1)
PROT SERPL-MCNC: 7 G/DL (ref 6–8.4)
RBC # BLD AUTO: 4.65 M/UL (ref 4–5.4)
SODIUM SERPL-SCNC: 143 MMOL/L (ref 136–145)
TRIGL SERPL-MCNC: 104 MG/DL (ref 30–150)
TSH SERPL DL<=0.005 MIU/L-ACNC: 2.78 UIU/ML (ref 0.4–4)
VIT B12 SERPL-MCNC: >2000 PG/ML (ref 210–950)
WBC # BLD AUTO: 5.41 K/UL (ref 3.9–12.7)

## 2023-02-03 PROCEDURE — 3078F DIAST BP <80 MM HG: CPT | Mod: CPTII,S$GLB,, | Performed by: FAMILY MEDICINE

## 2023-02-03 PROCEDURE — 90715 TDAP VACCINE GREATER THAN OR EQUAL TO 7YO IM: ICD-10-PCS | Mod: S$GLB,,, | Performed by: FAMILY MEDICINE

## 2023-02-03 PROCEDURE — 85025 COMPLETE CBC W/AUTO DIFF WBC: CPT | Performed by: FAMILY MEDICINE

## 2023-02-03 PROCEDURE — 3008F PR BODY MASS INDEX (BMI) DOCUMENTED: ICD-10-PCS | Mod: CPTII,S$GLB,, | Performed by: FAMILY MEDICINE

## 2023-02-03 PROCEDURE — 1159F MED LIST DOCD IN RCRD: CPT | Mod: CPTII,S$GLB,, | Performed by: FAMILY MEDICINE

## 2023-02-03 PROCEDURE — 80053 COMPREHEN METABOLIC PANEL: CPT | Performed by: FAMILY MEDICINE

## 2023-02-03 PROCEDURE — 82607 VITAMIN B-12: CPT | Performed by: FAMILY MEDICINE

## 2023-02-03 PROCEDURE — 99999 PR PBB SHADOW E&M-EST. PATIENT-LVL IV: ICD-10-PCS | Mod: PBBFAC,,, | Performed by: FAMILY MEDICINE

## 2023-02-03 PROCEDURE — 99396 PREV VISIT EST AGE 40-64: CPT | Mod: 25,S$GLB,, | Performed by: FAMILY MEDICINE

## 2023-02-03 PROCEDURE — 90471 TDAP VACCINE GREATER THAN OR EQUAL TO 7YO IM: ICD-10-PCS | Mod: S$GLB,,, | Performed by: FAMILY MEDICINE

## 2023-02-03 PROCEDURE — 3074F SYST BP LT 130 MM HG: CPT | Mod: CPTII,S$GLB,, | Performed by: FAMILY MEDICINE

## 2023-02-03 PROCEDURE — 90715 TDAP VACCINE 7 YRS/> IM: CPT | Mod: S$GLB,,, | Performed by: FAMILY MEDICINE

## 2023-02-03 PROCEDURE — 3008F BODY MASS INDEX DOCD: CPT | Mod: CPTII,S$GLB,, | Performed by: FAMILY MEDICINE

## 2023-02-03 PROCEDURE — 36415 COLL VENOUS BLD VENIPUNCTURE: CPT | Mod: PO | Performed by: FAMILY MEDICINE

## 2023-02-03 PROCEDURE — 1159F PR MEDICATION LIST DOCUMENTED IN MEDICAL RECORD: ICD-10-PCS | Mod: CPTII,S$GLB,, | Performed by: FAMILY MEDICINE

## 2023-02-03 PROCEDURE — 99999 PR PBB SHADOW E&M-EST. PATIENT-LVL IV: CPT | Mod: PBBFAC,,, | Performed by: FAMILY MEDICINE

## 2023-02-03 PROCEDURE — 3074F PR MOST RECENT SYSTOLIC BLOOD PRESSURE < 130 MM HG: ICD-10-PCS | Mod: CPTII,S$GLB,, | Performed by: FAMILY MEDICINE

## 2023-02-03 PROCEDURE — 84443 ASSAY THYROID STIM HORMONE: CPT | Performed by: FAMILY MEDICINE

## 2023-02-03 PROCEDURE — 82746 ASSAY OF FOLIC ACID SERUM: CPT | Performed by: FAMILY MEDICINE

## 2023-02-03 PROCEDURE — 83036 HEMOGLOBIN GLYCOSYLATED A1C: CPT | Performed by: FAMILY MEDICINE

## 2023-02-03 PROCEDURE — 3078F PR MOST RECENT DIASTOLIC BLOOD PRESSURE < 80 MM HG: ICD-10-PCS | Mod: CPTII,S$GLB,, | Performed by: FAMILY MEDICINE

## 2023-02-03 PROCEDURE — 82306 VITAMIN D 25 HYDROXY: CPT | Performed by: FAMILY MEDICINE

## 2023-02-03 PROCEDURE — 90471 IMMUNIZATION ADMIN: CPT | Mod: S$GLB,,, | Performed by: FAMILY MEDICINE

## 2023-02-03 PROCEDURE — 99396 PR PREVENTIVE VISIT,EST,40-64: ICD-10-PCS | Mod: 25,S$GLB,, | Performed by: FAMILY MEDICINE

## 2023-02-03 PROCEDURE — 80061 LIPID PANEL: CPT | Performed by: FAMILY MEDICINE

## 2023-02-03 RX ORDER — TIRZEPATIDE 5 MG/.5ML
5 INJECTION, SOLUTION SUBCUTANEOUS
Qty: 4 PEN | Refills: 2 | Status: SHIPPED | OUTPATIENT
Start: 2023-02-03 | End: 2023-05-04

## 2023-02-03 NOTE — PROGRESS NOTES
Assessment:       1. Annual visit for general adult medical examination with abnormal findings    2. Encounter for screening mammogram for malignant neoplasm of breast    3. Screening for diabetes mellitus    4. Numbness and tingling    5. BMI 31.0-31.9,adult    6. Keratitis sicca, bilateral    7. Class 1 obesity due to excess calories without serious comorbidity with body mass index (BMI) of 31.0 to 31.9 in adult          Plan:       Annual visit for general adult medical examination with abnormal findings  -     (In Office Administered) Tdap Vaccine  -     CBC Auto Differential; Future; Expected date: 02/03/2023  -     Comprehensive Metabolic Panel; Future; Expected date: 02/03/2023  -     Lipid Panel; Future; Expected date: 02/03/2023  -     TSH; Future; Expected date: 02/03/2023  -     Hemoglobin A1C; Future; Expected date: 02/03/2023  -     Vitamin B12; Future; Expected date: 02/03/2023  -     Folate; Future; Expected date: 02/03/2023  -     Vitamin D; Future; Expected date: 02/03/2023    Encounter for screening mammogram for malignant neoplasm of breast  -     Mammo Digital Screening Bilat; Future; Expected date: 02/03/2023    Screening for diabetes mellitus  -     Hemoglobin A1C; Future; Expected date: 02/03/2023    Numbness and tingling:  New problem workup needed  -     Vitamin B12; Future; Expected date: 02/03/2023  -     Folate; Future; Expected date: 02/03/2023    BMI 31.0-31.9,adult:  Improved  -     Vitamin D; Future; Expected date: 02/03/2023    Keratitis sicca, bilateral:  Stable    Class 1 obesity due to excess calories without serious comorbidity with body mass index (BMI) of 31.0 to 31.9 in adult:  Improved  -     tirzepatide (MOUNJARO) 5 mg/0.5 mL PnIj; Inject 5 mg into the skin every 7 days.  Dispense: 4 pen; Refill: 2         Will increase the dosage of the Mounjaro to 5 mg every week, the patient was advised to continue to healthy, meditation techniques with breathing were recommended for the  patient.  The patient's BMI has been recorded in the chart. The patient has been provided educational materials regarding the benefits of attaining and maintaining a normal weight. We will continue to address and follow this issue during follow up visits.  If the symptoms get worse, the patient notify us immediately.   Patient agreed with assessment and plan. Patient verbalized understanding.     Subjective:       Patient ID: Zahida Dodd is a 63 y.o. female.    Chief Complaint: Follow-up (3 month follow up ) and Annual Exam    HPI    Patient coming here today for annual checkup and follow-up.  The patient lost more than 13 lb since her last office visit, the patient is currently using Mounjaro 2.5 mg every week, the patient denies any side effects of the medication, the patient stated that she has less cravings, she is feeling well.  She complains of numbness sensation in the toes and sometimes in the hands, the patient also stated that she is having some episode of chest pressure specially when he drives and feels anxious and after breathing deeply, the patient stated the symptoms subside.  She denies any chest pain shortness of breath at this office visit.    Past medical history, past social history was reviewed and discussed with the patient.    Review of Systems   Constitutional:  Negative for activity change and appetite change.   HENT:  Negative for ear discharge.    Eyes:  Negative for discharge and itching.   Respiratory:  Positive for chest tightness. Negative for choking.    Cardiovascular:  Negative for palpitations and leg swelling.   Gastrointestinal:  Negative for abdominal distention and constipation.   Endocrine: Negative for cold intolerance and heat intolerance.   Genitourinary:  Negative for dysuria and flank pain.   Musculoskeletal:  Negative for back pain.   Skin:  Negative for pallor.   Allergic/Immunologic: Negative for environmental allergies and food allergies.   Neurological:   Positive for numbness. Negative for dizziness and facial asymmetry.   Hematological:  Negative for adenopathy. Does not bruise/bleed easily.   Psychiatric/Behavioral:  Positive for sleep disturbance. Negative for agitation, confusion and decreased concentration. The patient is nervous/anxious.      Objective:      Physical Exam  Vitals and nursing note reviewed.   Constitutional:       General: She is not in acute distress.     Appearance: She is well-developed. She is not diaphoretic.   HENT:      Head: Normocephalic and atraumatic.      Right Ear: External ear normal.      Left Ear: External ear normal.      Nose: Nose normal.      Mouth/Throat:      Pharynx: No oropharyngeal exudate.   Eyes:      General: No scleral icterus.        Right eye: No discharge.         Left eye: No discharge.      Conjunctiva/sclera: Conjunctivae normal.      Pupils: Pupils are equal, round, and reactive to light.   Cardiovascular:      Rate and Rhythm: Normal rate and regular rhythm.      Heart sounds: Normal heart sounds.   Pulmonary:      Effort: Pulmonary effort is normal. No respiratory distress.      Breath sounds: Normal breath sounds. No wheezing.   Musculoskeletal:         General: No tenderness or deformity.      Cervical back: Neck supple.   Psychiatric:         Behavior: Behavior normal.         Thought Content: Thought content normal.         Judgment: Judgment normal.

## 2023-02-03 NOTE — TELEPHONE ENCOUNTER
This message is in regards to GLP-1. Mounjaro    Under the patient's insurance plan  this medication is ONLY approved for the following diagnoses: TYPE 2 DIABETES with a Hemoglobin A1C of 6.5 or greater.   After reviewing the patient's chart, there was not information that the patient has that diagnosis.   Insurance companies states that any diagnosis such as the following: METABOLIC SYNDROME, INSULIN RESISTANCE, PREDIABETES, WEIGHT LOSS MANAGEMENT, OR OBESITY will not be approved as these are not approved by the FDA.     Victoza, Ozempic , Trulicity, and Mounjaro are ONLY approved for Type 2 Diabetes and no other indication per FDA.     The patient does not meet the criteria to submit a prior authorization. If there is more information you would like to provide that would help the patient meet the criteria, please do not hesitate to reach out to us.     Thank you.     Sincerely      Family Practice   Special Services Coordinators  Prior Authorization Department

## 2023-03-06 ENCOUNTER — HOSPITAL ENCOUNTER (OUTPATIENT)
Dept: RADIOLOGY | Facility: HOSPITAL | Age: 63
Discharge: HOME | End: 2023-03-06
Attending: FAMILY MEDICINE
Payer: COMMERCIAL

## 2023-03-06 DIAGNOSIS — Z12.31 ENCOUNTER FOR SCREENING MAMMOGRAM FOR MALIGNANT NEOPLASM OF BREAST: ICD-10-CM

## 2023-03-06 PROCEDURE — 77063 BREAST TOMOSYNTHESIS BI: CPT | Mod: 26,,, | Performed by: RADIOLOGY

## 2023-03-06 PROCEDURE — 77067 SCR MAMMO BI INCL CAD: CPT | Mod: 26,,, | Performed by: RADIOLOGY

## 2023-03-06 PROCEDURE — 77067 MAMMO DIGITAL SCREENING BILAT WITH TOMO: ICD-10-PCS | Mod: 26,,, | Performed by: RADIOLOGY

## 2023-03-06 PROCEDURE — 77067 SCR MAMMO BI INCL CAD: CPT | Mod: TC,PO

## 2023-03-06 PROCEDURE — 77063 MAMMO DIGITAL SCREENING BILAT WITH TOMO: ICD-10-PCS | Mod: 26,,, | Performed by: RADIOLOGY

## 2023-03-23 ENCOUNTER — TELEPHONE (OUTPATIENT)
Dept: FAMILY MEDICINE | Facility: CLINIC | Age: 63
End: 2023-03-23
Payer: COMMERCIAL

## 2023-03-23 NOTE — TELEPHONE ENCOUNTER
----- Message from Alexandria Carreno sent at 3/23/2023  8:30 AM CDT -----  Regarding: Needs call back  Type: Needs Medical Advice  Who Called:  Zahida Wilhelm Call Back Number: [phone]    Additional Information: Needs call back from nurse

## 2023-03-24 ENCOUNTER — TELEPHONE (OUTPATIENT)
Dept: FAMILY MEDICINE | Facility: CLINIC | Age: 63
End: 2023-03-24

## 2023-03-24 ENCOUNTER — TELEPHONE (OUTPATIENT)
Dept: FAMILY MEDICINE | Facility: CLINIC | Age: 63
End: 2023-03-24
Payer: COMMERCIAL

## 2023-03-24 ENCOUNTER — OFFICE VISIT (OUTPATIENT)
Dept: FAMILY MEDICINE | Facility: CLINIC | Age: 63
End: 2023-03-24
Payer: COMMERCIAL

## 2023-03-24 ENCOUNTER — HOSPITAL ENCOUNTER (OUTPATIENT)
Dept: RADIOLOGY | Facility: HOSPITAL | Age: 63
Discharge: HOME | End: 2023-03-24
Attending: NURSE PRACTITIONER
Payer: COMMERCIAL

## 2023-03-24 VITALS
SYSTOLIC BLOOD PRESSURE: 110 MMHG | OXYGEN SATURATION: 99 % | DIASTOLIC BLOOD PRESSURE: 72 MMHG | BODY MASS INDEX: 30.79 KG/M2 | HEART RATE: 67 BPM | WEIGHT: 191.56 LBS | TEMPERATURE: 99 F | HEIGHT: 66 IN

## 2023-03-24 DIAGNOSIS — K57.90 DIVERTICULOSIS: ICD-10-CM

## 2023-03-24 DIAGNOSIS — K92.1 HEMATOCHEZIA: Primary | ICD-10-CM

## 2023-03-24 DIAGNOSIS — R10.30 LOWER ABDOMINAL PAIN: ICD-10-CM

## 2023-03-24 DIAGNOSIS — K92.1 HEMATOCHEZIA: ICD-10-CM

## 2023-03-24 PROCEDURE — 99214 PR OFFICE/OUTPT VISIT, EST, LEVL IV, 30-39 MIN: ICD-10-PCS | Mod: S$GLB,,, | Performed by: NURSE PRACTITIONER

## 2023-03-24 PROCEDURE — 3008F BODY MASS INDEX DOCD: CPT | Mod: CPTII,S$GLB,, | Performed by: NURSE PRACTITIONER

## 2023-03-24 PROCEDURE — 74177 CT ABDOMEN PELVIS WITH CONTRAST: ICD-10-PCS | Mod: 26,,, | Performed by: RADIOLOGY

## 2023-03-24 PROCEDURE — 3078F DIAST BP <80 MM HG: CPT | Mod: CPTII,S$GLB,, | Performed by: NURSE PRACTITIONER

## 2023-03-24 PROCEDURE — 99999 PR PBB SHADOW E&M-EST. PATIENT-LVL V: CPT | Mod: PBBFAC,,, | Performed by: NURSE PRACTITIONER

## 2023-03-24 PROCEDURE — 3044F PR MOST RECENT HEMOGLOBIN A1C LEVEL <7.0%: ICD-10-PCS | Mod: CPTII,S$GLB,, | Performed by: NURSE PRACTITIONER

## 2023-03-24 PROCEDURE — A9698 NON-RAD CONTRAST MATERIALNOC: HCPCS | Mod: PO | Performed by: NURSE PRACTITIONER

## 2023-03-24 PROCEDURE — 74177 CT ABD & PELVIS W/CONTRAST: CPT | Mod: 26,,, | Performed by: RADIOLOGY

## 2023-03-24 PROCEDURE — 3074F PR MOST RECENT SYSTOLIC BLOOD PRESSURE < 130 MM HG: ICD-10-PCS | Mod: CPTII,S$GLB,, | Performed by: NURSE PRACTITIONER

## 2023-03-24 PROCEDURE — 3078F PR MOST RECENT DIASTOLIC BLOOD PRESSURE < 80 MM HG: ICD-10-PCS | Mod: CPTII,S$GLB,, | Performed by: NURSE PRACTITIONER

## 2023-03-24 PROCEDURE — 25500020 PHARM REV CODE 255: Mod: PO | Performed by: NURSE PRACTITIONER

## 2023-03-24 PROCEDURE — 1159F MED LIST DOCD IN RCRD: CPT | Mod: CPTII,S$GLB,, | Performed by: NURSE PRACTITIONER

## 2023-03-24 PROCEDURE — 74177 CT ABD & PELVIS W/CONTRAST: CPT | Mod: TC,PO

## 2023-03-24 PROCEDURE — 1159F PR MEDICATION LIST DOCUMENTED IN MEDICAL RECORD: ICD-10-PCS | Mod: CPTII,S$GLB,, | Performed by: NURSE PRACTITIONER

## 2023-03-24 PROCEDURE — 3008F PR BODY MASS INDEX (BMI) DOCUMENTED: ICD-10-PCS | Mod: CPTII,S$GLB,, | Performed by: NURSE PRACTITIONER

## 2023-03-24 PROCEDURE — 3044F HG A1C LEVEL LT 7.0%: CPT | Mod: CPTII,S$GLB,, | Performed by: NURSE PRACTITIONER

## 2023-03-24 PROCEDURE — 99214 OFFICE O/P EST MOD 30 MIN: CPT | Mod: S$GLB,,, | Performed by: NURSE PRACTITIONER

## 2023-03-24 PROCEDURE — 99999 PR PBB SHADOW E&M-EST. PATIENT-LVL V: ICD-10-PCS | Mod: PBBFAC,,, | Performed by: NURSE PRACTITIONER

## 2023-03-24 PROCEDURE — 3074F SYST BP LT 130 MM HG: CPT | Mod: CPTII,S$GLB,, | Performed by: NURSE PRACTITIONER

## 2023-03-24 RX ADMIN — IOHEXOL 1000 ML: 9 SOLUTION ORAL at 01:03

## 2023-03-24 RX ADMIN — IOHEXOL 100 ML: 350 INJECTION, SOLUTION INTRAVENOUS at 01:03

## 2023-03-24 NOTE — TELEPHONE ENCOUNTER
Notified labs and CT negative for acute process  Educated on constipation management  Educated on symptom monitoring and return precautions   FU 1 month as scheduled with PCP, sooner with me as needed

## 2023-03-24 NOTE — TELEPHONE ENCOUNTER
----- Message from Mackenzie Alexander sent at 3/24/2023  1:50 PM CDT -----  Name of Who is Calling:BRENDA GUARDADO [[id number]]       What is the request in detail: pt stated that she would like to know if she is needing to wait or leave following doing CT , stated she is wanting to know whats going on and looking for results        Can the clinic reply by [facility]: yes        What Number to Call Back if not in [facility]:[phone]

## 2023-03-24 NOTE — TELEPHONE ENCOUNTER
----- Message from Alexandria Carreno sent at 3/24/2023  8:20 AM CDT -----  Regarding: Needs to be seen today  Type:  Same Day Appointment Request    Caller is requesting a same day appointment.  Caller declined first available appointment listed below.      Name of Caller:  Zahida  When is the first available appointment?  5/3/23  Symptoms:  Abdominal pain, blood in stool, constipation   Best Call Back Number:  [phone]    Additional Information:   Please call patient she also wanted to spoeak to a nurse also to be seen today if possible

## 2023-03-24 NOTE — TELEPHONE ENCOUNTER
----- Message from Kunal Vargas sent at 3/24/2023  1:43 PM CDT -----  Type: Needs Medical Advice  Who Called:  Patient    Best Call Back Number: [phone]  Additional Information: Patient states that she would like a callback regarding her CT scan today

## 2023-03-24 NOTE — TELEPHONE ENCOUNTER
Spoke to pt. She is having a flare up with diverticulitis. She has been very constipated for about 1 week. Saturday she had 5 bowel movements and then one bowel movement yesterday. Abdominal pain comes and goes. She has been dealing with this for 2 weeks. She is starting to notice blood in her stool yesterday. Appt scheduled for today.

## 2023-03-24 NOTE — PROGRESS NOTES
Subjective:       Patient ID: Zahida Dodd is a 63 y.o. female.    Chief Complaint: blood in stool , Diarrhea, and Constipation    HPI  New patient to me  Blood in stool x 2 weeks--maroon--improving in quantity since onset, scant amount last night. No rectal bleeding  Reports chronic constipation  BM roughly every 4 days with straining   Stool is hard  She reports external hemorrhoid--does not associate blood with this    Denies rectal bleed, epigastric pain, N/V, GERD symptoms, fever, shortness of breath, dizziness/lightheadedness, CP, palpitations or fatigue    Denies alcohol or smoking  No NSAID use    Hx diverticulosis, diverticulitis flare 2021. She is concerned for diverticulitis--generalized lower abdominal pain.     Colonoscopy 5/10/21:  Findings:        A few small-mouthed diverticula were found in the sigmoid colon and        descending colon.        The exam was otherwise without abnormality to cecum.     Vitals:    03/24/23 1058   BP: 110/72   Pulse: 67   Temp: 98.6 °F (37 °C)     Review of Systems   Constitutional:  Negative for fatigue and fever.   Respiratory:  Negative for cough and shortness of breath.    Cardiovascular:  Negative for chest pain.   Gastrointestinal:  Positive for abdominal pain, blood in stool and constipation. Negative for anal bleeding, diarrhea, nausea and vomiting.   Genitourinary:  Negative for dysuria.     Past Medical History:   Diagnosis Date    Arthritis     Diverticulitis 02/09/2021    Hyperlipidemia      Objective:      Physical Exam  Vitals and nursing note reviewed.   Constitutional:       General: She is not in acute distress.     Appearance: She is not diaphoretic.   HENT:      Head: Normocephalic.   Eyes:      General:         Right eye: No discharge.         Left eye: No discharge.   Neck:      Trachea: No tracheal deviation.   Cardiovascular:      Rate and Rhythm: Normal rate.      Heart sounds: Normal heart sounds.   Pulmonary:      Effort: Pulmonary  effort is normal.      Breath sounds: Normal breath sounds.   Abdominal:      General: There is no distension.      Palpations: Abdomen is soft.      Tenderness: There is no right CVA tenderness, left CVA tenderness, guarding or rebound.      Comments: Very mild tenderness across lower abdomen. No focal tenderness   Skin:     Coloration: Skin is not pale.   Neurological:      Mental Status: She is alert and oriented to person, place, and time.   Psychiatric:         Speech: Speech normal.         Behavior: Behavior normal.         Thought Content: Thought content normal.         Judgment: Judgment normal.       Assessment:       1. Hematochezia    2. Diverticulosis    3. Lower abdominal pain        Plan:       Hematochezia  -     CBC Auto Differential; Future; Expected date: 03/24/2023  -     Ferritin; Future; Expected date: 03/24/2023  -     Iron and TIBC; Future; Expected date: 03/24/2023        -     Comprehensive Metabolic Panel; Future; Expected date: 03/24/2023  -     CT Abdomen Pelvis With Contrast; Future; Expected date: 03/24/2023    Diverticulosis  -     CT Abdomen Pelvis With Contrast; Future; Expected date: 03/24/2023    Lower abdominal pain  -     CT Abdomen Pelvis With Contrast; Future; Expected date: 03/24/2023      Discussed contrast recommendations with radiology  Labs and CT today  ER precautions given           Medication List with Changes/Refills   Current Medications    ASCORBIC ACID, VITAMIN C, (VITAMIN C) 1000 MG TABLET    Take 1,000 mg by mouth once daily.    ESCITALOPRAM OXALATE (LEXAPRO) 5 MG TAB    Take 1 tablet (5 mg total) by mouth nightly.    FLUTICASONE PROPIONATE (FLONASE) 50 MCG/ACTUATION NASAL SPRAY    fluticasone propionate 50 mcg/actuation nasal spray,suspension    [id number]-ALGAL OIL (METANX/FOLTANX RF) 3 MG-35 MG-2 MG -90.314 MG CAP    Take 1 capsule by mouth 2 (two) times a day.    LOTEPREDNOL (LOTEMAX) 0.5 % OPHTHALMIC SUSPENSION    Lotemax 0.5 % eye drops,suspension     MELATONIN 5 MG TBDL    Take 1 tablet by mouth every evening.    NALTREXONE-BUPROPION (CONTRAVE) 8-90 MG TBSR    Take 1 tablet p.o. in the morning for 1 week, then 1 tablet twice daily for 1 week, then 1 tablet in the morning and 2 tablets at p.m. for 1 week, then 2 tablets twice daily.    OMEGA-3 FATTY ACIDS/FISH OIL (FISH OIL-OMEGA-3 FATTY ACIDS) 300-1,000 MG CAPSULE    Take by mouth once daily.    RESTASIS 0.05 % OPHTHALMIC EMULSION        TIRZEPATIDE (MOUNJARO) 5 MG/0.5 ML PNIJ    Inject 5 mg into the skin every 7 days.    VITAMIN D (VITAMIN D3) 1000 UNITS TAB    Take 2,000 Units by mouth once daily.

## 2023-03-30 ENCOUNTER — TELEPHONE (OUTPATIENT)
Dept: FAMILY MEDICINE | Facility: CLINIC | Age: 63
End: 2023-03-30
Payer: COMMERCIAL

## 2023-03-30 NOTE — TELEPHONE ENCOUNTER
Left message for patient to return call to see about scheduling to a sooner appointment than 4/10/23.

## 2023-03-30 NOTE — TELEPHONE ENCOUNTER
----- Message from Patricia Sanford sent at 3/30/2023  8:02 AM CDT -----  Contact: patient  Type:  Sooner Apoointment Request    Caller is requesting a sooner appointment.  Caller declined first available appointment listed below.  Caller will not accept being placed on the waitlist and is requesting a message be sent to doctor.    Name of Caller: patient     When is the first available appointment? 4/10    Symptoms: stomach problems     Would the patient rather a call back or a response via [facility]? Call     Best Call Back Number:[phone] (home)      Additional Information:

## 2023-03-31 ENCOUNTER — TELEPHONE (OUTPATIENT)
Dept: FAMILY MEDICINE | Facility: CLINIC | Age: 63
End: 2023-03-31
Payer: COMMERCIAL

## 2023-03-31 NOTE — TELEPHONE ENCOUNTER
Patient states that she has increased her fluids and take in fiber to help with her constipation. She recently saw Sang who ordered a CT scan and the scan was normal. Patient stated that the constipation has not subsided. Please advise.

## 2023-03-31 NOTE — TELEPHONE ENCOUNTER
----- Message from Andressa Andrade sent at 3/30/2023  4:43 PM CDT -----  Contact: self  Type: Patient Returning Call        Who Called: Patient   Who Left Message for Patient: Nurse Livingston   Does the patient know what this is regarding?: n/a  Best Call Back Number: [phone] (home)   Additional Information: Plz call pt back soon. She was ret a call. Thanks

## 2023-04-03 ENCOUNTER — PATIENT MESSAGE (OUTPATIENT)
Dept: FAMILY MEDICINE | Facility: CLINIC | Age: 63
End: 2023-04-03
Payer: COMMERCIAL

## 2023-09-14 ENCOUNTER — OFFICE VISIT (OUTPATIENT)
Dept: OBSTETRICS AND GYNECOLOGY | Facility: CLINIC | Age: 63
End: 2023-09-14
Payer: COMMERCIAL

## 2023-09-14 VITALS
DIASTOLIC BLOOD PRESSURE: 74 MMHG | SYSTOLIC BLOOD PRESSURE: 118 MMHG | BODY MASS INDEX: 28.86 KG/M2 | WEIGHT: 178.81 LBS

## 2023-09-14 DIAGNOSIS — Z01.419 ROUTINE GYNECOLOGICAL EXAMINATION: Primary | ICD-10-CM

## 2023-09-14 DIAGNOSIS — Z90.711: ICD-10-CM

## 2023-09-14 PROCEDURE — 1159F PR MEDICATION LIST DOCUMENTED IN MEDICAL RECORD: ICD-10-PCS | Mod: CPTII,S$GLB,, | Performed by: OBSTETRICS & GYNECOLOGY

## 2023-09-14 PROCEDURE — 3078F PR MOST RECENT DIASTOLIC BLOOD PRESSURE < 80 MM HG: ICD-10-PCS | Mod: CPTII,S$GLB,, | Performed by: OBSTETRICS & GYNECOLOGY

## 2023-09-14 PROCEDURE — 3044F HG A1C LEVEL LT 7.0%: CPT | Mod: CPTII,S$GLB,, | Performed by: OBSTETRICS & GYNECOLOGY

## 2023-09-14 PROCEDURE — 3074F PR MOST RECENT SYSTOLIC BLOOD PRESSURE < 130 MM HG: ICD-10-PCS | Mod: CPTII,S$GLB,, | Performed by: OBSTETRICS & GYNECOLOGY

## 2023-09-14 PROCEDURE — 3044F PR MOST RECENT HEMOGLOBIN A1C LEVEL <7.0%: ICD-10-PCS | Mod: CPTII,S$GLB,, | Performed by: OBSTETRICS & GYNECOLOGY

## 2023-09-14 PROCEDURE — 1159F MED LIST DOCD IN RCRD: CPT | Mod: CPTII,S$GLB,, | Performed by: OBSTETRICS & GYNECOLOGY

## 2023-09-14 PROCEDURE — 99396 PR PREVENTIVE VISIT,EST,40-64: ICD-10-PCS | Mod: S$GLB,,, | Performed by: OBSTETRICS & GYNECOLOGY

## 2023-09-14 PROCEDURE — 88175 CYTOPATH C/V AUTO FLUID REDO: CPT | Performed by: OBSTETRICS & GYNECOLOGY

## 2023-09-14 PROCEDURE — 99999 PR PBB SHADOW E&M-EST. PATIENT-LVL III: ICD-10-PCS | Mod: PBBFAC,,, | Performed by: OBSTETRICS & GYNECOLOGY

## 2023-09-14 PROCEDURE — 99396 PREV VISIT EST AGE 40-64: CPT | Mod: S$GLB,,, | Performed by: OBSTETRICS & GYNECOLOGY

## 2023-09-14 PROCEDURE — 3008F BODY MASS INDEX DOCD: CPT | Mod: CPTII,S$GLB,, | Performed by: OBSTETRICS & GYNECOLOGY

## 2023-09-14 PROCEDURE — 3074F SYST BP LT 130 MM HG: CPT | Mod: CPTII,S$GLB,, | Performed by: OBSTETRICS & GYNECOLOGY

## 2023-09-14 PROCEDURE — 87624 HPV HI-RISK TYP POOLED RSLT: CPT | Performed by: OBSTETRICS & GYNECOLOGY

## 2023-09-14 PROCEDURE — 3078F DIAST BP <80 MM HG: CPT | Mod: CPTII,S$GLB,, | Performed by: OBSTETRICS & GYNECOLOGY

## 2023-09-14 PROCEDURE — 3008F PR BODY MASS INDEX (BMI) DOCUMENTED: ICD-10-PCS | Mod: CPTII,S$GLB,, | Performed by: OBSTETRICS & GYNECOLOGY

## 2023-09-14 PROCEDURE — 99999 PR PBB SHADOW E&M-EST. PATIENT-LVL III: CPT | Mod: PBBFAC,,, | Performed by: OBSTETRICS & GYNECOLOGY

## 2023-09-14 NOTE — PROGRESS NOTES
Chief Complaint   Patient presents with    Well Woman     History of Present Illness: Zahida Dodd is a 63 y.o. female that presents today 9/14/2023 for well gyn visit.    Past Medical History:   Diagnosis Date    Arthritis     Diverticulitis 02/09/2021    Hyperlipidemia        Past Surgical History:   Procedure Laterality Date    COLONOSCOPY  2014    repeat in 10    COLONOSCOPY N/A 5/10/2021    Procedure: COLONOSCOPY;  Surgeon: Cale Funk MD;  Location: [facility];  Service: Endoscopy;  Laterality: N/A;    HYSTERECTOMY  2004    cervix present, on HRT for 7 years age 44-49    OOPHORECTOMY      REDUCTION OF BOTH BREASTS      TOTAL REDUCTION MAMMOPLASTY         Outpatient Medications Prior to Visit   Medication Sig Dispense Refill    ascorbic acid, vitamin C, (VITAMIN C) 1000 MG tablet Take 1,000 mg by mouth once daily.      fluticasone propionate (FLONASE) 50 mcg/actuation nasal spray fluticasone propionate 50 mcg/actuation nasal spray,suspension      [id number]-algal oil (METANX/FOLTANX RF) 3 mg-35 mg-2 mg -90.314 mg Cap Take 1 capsule by mouth 2 (two) times a day. 180 capsule 3    loteprednol (LOTEMAX) 0.5 % ophthalmic suspension Lotemax 0.5 % eye drops,suspension      melatonin 5 mg TbDL Take 1 tablet by mouth every evening.      omega-3 fatty acids/fish oil (FISH OIL-OMEGA-3 FATTY ACIDS) 300-1,000 mg capsule Take by mouth once daily.      RESTASIS 0.05 % ophthalmic emulsion       vitamin D (VITAMIN D3) 1000 units Tab Take 2,000 Units by mouth once daily.      EScitalopram oxalate (LEXAPRO) 5 MG Tab Take 1 tablet (5 mg total) by mouth nightly. (Patient not taking: Reported on 9/14/2023) 30 tablet 11    naltrexone-bupropion (CONTRAVE) 8-90 mg TbSR Take 1 tablet p.o. in the morning for 1 week, then 1 tablet twice daily for 1 week, then 1 tablet in the morning and 2 tablets at p.m. for 1 week, then 2 tablets twice daily. (Patient not taking: Reported on 9/14/2023) 120 tablet 2     No  facility-administered medications prior to visit.       Review of patient's allergies indicates:   Allergen Reactions    Penicillins Hives       Family History   Problem Relation Age of Onset    Heart disease Mother     Dementia Mother     Breast cancer Neg Hx     Ovarian cancer Neg Hx     Glaucoma Neg Hx     Colon cancer Neg Hx        Social History     Socioeconomic History    Marital status:    Tobacco Use    Smoking status: Never    Smokeless tobacco: Never   Substance and Sexual Activity    Alcohol use: Yes     Comment: rare    Drug use: Never    Sexual activity: Not Currently     Birth control/protection: See Surgical Hx     Social Determinants of Health     Stress: Stress Concern Present (2019)    [employer] - Occupational Stress Questionnaire     Feeling of Stress : Very much       OB History    Para Term  AB Living   3 3 3         SAB IAB Ectopic Multiple Live Births                  # Outcome Date GA Lbr Waqas/2nd Weight Sex Delivery Anes PTL Lv   3 Term      CS-LTranv      2 Term      CS-LTranv      1 Term      CS-LTranv          Review of Symptoms:  GENERAL: Denies weight gain or weight loss. Feeling well overall.   SKIN: Denies rash or lesions.   HEAD: Denies head injury or headache.   NODES: Denies enlarged lymph nodes.   CHEST: Denies chest pain or shortness of breath.   CARDIOVASCULAR: Denies palpitations or left sided chest pain.   ABDOMEN: No abdominal pain, constipation, diarrhea, nausea, vomiting or rectal bleeding.   URINARY: No frequency, dysuria, hematuria, or burning on urination.  HEMATOLOGIC: No easy bruisability or excessive bleeding.   MUSCULOSKELETAL: Denies joint pain or swelling.     /74   Wt 81.1 kg (178 lb 12.7 oz)   Physical Exam:  APPEARANCE: Well nourished, well developed, in no acute distress.  SKIN: Normal skin turgor, no lesions.  NECK: Neck symmetric without masses   RESPIRATORY: Normal respiratory effort with no  retractions or use of accessory muscles  CARDIOVASCULAR: Peripheral vascular system with no swelling no varicosities and palpation of pulses normal  LYMPHATIC: No enlargements of the lymph nodes noted in the neck, axillae, or groin  ABDOMEN: Soft. No tenderness or masses. No hepatosplenomegaly. No hernias.  BREASTS: Symmetrical, no skin changes or visible lesions. No palpable masses, nipple discharge or adenopathy bilaterally.  PELVIC: Normal external female genitalia without lesions. Normal hair distribution. Adequate perineal body, normal urethral meatus. Urethra with no masses.  Bladder nontender. Vagina moist and well rugated without lesions or discharge. No significant cystocele or rectocele.  Adnexa without masses or tenderness. Urethra and bladder normal.     Cervix present   PAP done       EXTREMITIES: No clubbing cyanosis or edema.    ASSESSMENT/PLAN:  Routine gynecological examination  -     Liquid-Based Pap Smear, Screening  -     HPV High Risk Genotypes, PCR    Hx of abdominal supracervical subtotal hysterectomy  -     Liquid-Based Pap Smear, Screening  -     HPV High Risk Genotypes, PCR      MMG done reviewed 3/23    Patient was counseled today on Pap guidelines. We discussed the discontinuing the pap smear after hysterectomy except in certain high risk cases.  We discussed the need for pelvic exams.   We discussed STD screening if at high risk for an STD.  We discussed breast cancer screening with mammograms every other year after the age of 40 and annually after the age of 50.    We discussed colon cancer screening.   Osteoporosis screening with the Dexa Bone Scan discussed when indicated.   She will see her PCP for other health maintenance.       FOLLOW-UP:prn

## 2023-09-19 LAB
FINAL PATHOLOGIC DIAGNOSIS: NORMAL
HPV HR 12 DNA SPEC QL NAA+PROBE: NEGATIVE
HPV16 AG SPEC QL: NEGATIVE
HPV18 DNA SPEC QL NAA+PROBE: NEGATIVE
Lab: NORMAL

## 2023-09-25 ENCOUNTER — HOSPITAL ENCOUNTER (OUTPATIENT)
Dept: RADIOLOGY | Facility: HOSPITAL | Age: 63
Discharge: HOME | End: 2023-09-25
Attending: STUDENT IN AN ORGANIZED HEALTH CARE EDUCATION/TRAINING PROGRAM
Payer: COMMERCIAL

## 2023-09-25 ENCOUNTER — OFFICE VISIT (OUTPATIENT)
Dept: ORTHOPEDICS | Facility: CLINIC | Age: 63
End: 2023-09-25
Payer: COMMERCIAL

## 2023-09-25 VITALS — WEIGHT: 178.81 LBS | BODY MASS INDEX: 28.74 KG/M2 | HEIGHT: 66 IN

## 2023-09-25 DIAGNOSIS — M25.511 CHRONIC RIGHT SHOULDER PAIN: ICD-10-CM

## 2023-09-25 DIAGNOSIS — M25.511 RIGHT SHOULDER PAIN, UNSPECIFIED CHRONICITY: ICD-10-CM

## 2023-09-25 DIAGNOSIS — M19.011 PRIMARY OSTEOARTHRITIS OF RIGHT SHOULDER: ICD-10-CM

## 2023-09-25 DIAGNOSIS — M75.01 ADHESIVE CAPSULITIS OF RIGHT SHOULDER: Primary | ICD-10-CM

## 2023-09-25 DIAGNOSIS — M75.31 CALCIFIC TENDINITIS OF RIGHT SHOULDER: ICD-10-CM

## 2023-09-25 DIAGNOSIS — G89.29 CHRONIC RIGHT SHOULDER PAIN: ICD-10-CM

## 2023-09-25 DIAGNOSIS — M75.30 CALCIFIC SUPRASPINATUS TENDONITIS: ICD-10-CM

## 2023-09-25 PROCEDURE — 3008F BODY MASS INDEX DOCD: CPT | Mod: CPTII,S$GLB,, | Performed by: STUDENT IN AN ORGANIZED HEALTH CARE EDUCATION/TRAINING PROGRAM

## 2023-09-25 PROCEDURE — 73030 XR SHOULDER COMPLETE 2 OR MORE VIEWS RIGHT: ICD-10-PCS | Mod: 26,RT,, | Performed by: RADIOLOGY

## 2023-09-25 PROCEDURE — 1159F PR MEDICATION LIST DOCUMENTED IN MEDICAL RECORD: ICD-10-PCS | Mod: CPTII,S$GLB,, | Performed by: STUDENT IN AN ORGANIZED HEALTH CARE EDUCATION/TRAINING PROGRAM

## 2023-09-25 PROCEDURE — 73030 X-RAY EXAM OF SHOULDER: CPT | Mod: 26,RT,, | Performed by: RADIOLOGY

## 2023-09-25 PROCEDURE — 99204 PR OFFICE/OUTPT VISIT, NEW, LEVL IV, 45-59 MIN: ICD-10-PCS | Mod: 25,S$GLB,, | Performed by: STUDENT IN AN ORGANIZED HEALTH CARE EDUCATION/TRAINING PROGRAM

## 2023-09-25 PROCEDURE — 3008F PR BODY MASS INDEX (BMI) DOCUMENTED: ICD-10-PCS | Mod: CPTII,S$GLB,, | Performed by: STUDENT IN AN ORGANIZED HEALTH CARE EDUCATION/TRAINING PROGRAM

## 2023-09-25 PROCEDURE — 1159F MED LIST DOCD IN RCRD: CPT | Mod: CPTII,S$GLB,, | Performed by: STUDENT IN AN ORGANIZED HEALTH CARE EDUCATION/TRAINING PROGRAM

## 2023-09-25 PROCEDURE — 3044F HG A1C LEVEL LT 7.0%: CPT | Mod: CPTII,S$GLB,, | Performed by: STUDENT IN AN ORGANIZED HEALTH CARE EDUCATION/TRAINING PROGRAM

## 2023-09-25 PROCEDURE — 99204 OFFICE O/P NEW MOD 45 MIN: CPT | Mod: 25,S$GLB,, | Performed by: STUDENT IN AN ORGANIZED HEALTH CARE EDUCATION/TRAINING PROGRAM

## 2023-09-25 PROCEDURE — 1160F RVW MEDS BY RX/DR IN RCRD: CPT | Mod: CPTII,S$GLB,, | Performed by: STUDENT IN AN ORGANIZED HEALTH CARE EDUCATION/TRAINING PROGRAM

## 2023-09-25 PROCEDURE — 1160F PR REVIEW ALL MEDS BY PRESCRIBER/CLIN PHARMACIST DOCUMENTED: ICD-10-PCS | Mod: CPTII,S$GLB,, | Performed by: STUDENT IN AN ORGANIZED HEALTH CARE EDUCATION/TRAINING PROGRAM

## 2023-09-25 PROCEDURE — 20611 DRAIN/INJ JOINT/BURSA W/US: CPT | Mod: RT,S$GLB,, | Performed by: STUDENT IN AN ORGANIZED HEALTH CARE EDUCATION/TRAINING PROGRAM

## 2023-09-25 PROCEDURE — 20611 LARGE JOINT ASPIRATION/INJECTION: R GLENOHUMERAL: ICD-10-PCS | Mod: RT,S$GLB,, | Performed by: STUDENT IN AN ORGANIZED HEALTH CARE EDUCATION/TRAINING PROGRAM

## 2023-09-25 PROCEDURE — 99999 PR PBB SHADOW E&M-EST. PATIENT-LVL III: CPT | Mod: PBBFAC,,, | Performed by: STUDENT IN AN ORGANIZED HEALTH CARE EDUCATION/TRAINING PROGRAM

## 2023-09-25 PROCEDURE — 99999 PR PBB SHADOW E&M-EST. PATIENT-LVL III: ICD-10-PCS | Mod: PBBFAC,,, | Performed by: STUDENT IN AN ORGANIZED HEALTH CARE EDUCATION/TRAINING PROGRAM

## 2023-09-25 PROCEDURE — 73030 X-RAY EXAM OF SHOULDER: CPT | Mod: TC,PO,RT

## 2023-09-25 PROCEDURE — 3044F PR MOST RECENT HEMOGLOBIN A1C LEVEL <7.0%: ICD-10-PCS | Mod: CPTII,S$GLB,, | Performed by: STUDENT IN AN ORGANIZED HEALTH CARE EDUCATION/TRAINING PROGRAM

## 2023-09-25 RX ORDER — TRIAMCINOLONE ACETONIDE 40 MG/ML
40 INJECTION, SUSPENSION INTRA-ARTICULAR; INTRAMUSCULAR
Status: DISCONTINUED | OUTPATIENT
Start: 2023-09-25 | End: 2023-09-25 | Stop reason: HOSPADM

## 2023-09-25 RX ADMIN — TRIAMCINOLONE ACETONIDE 40 MG: 40 INJECTION, SUSPENSION INTRA-ARTICULAR; INTRAMUSCULAR at 08:09

## 2023-09-25 NOTE — PROCEDURES
Large Joint Aspiration/Injection: R glenohumeral    Date/Time: 9/25/2023 8:40 AM    Performed by: Sage Rodney MD  Authorized by: Sage Rodney MD    Consent Done?:  Yes (Verbal)  Indications:  Pain and arthritis  Site marked: the procedure site was marked    Timeout: prior to procedure the correct patient, procedure, and site was verified    Prep: patient was prepped and draped in usual sterile fashion    Local anesthetic:  Bupivacaine 0.5% without epinephrine and lidocaine 1% without epinephrine    Details:  Needle Size:  22 G  Ultrasonic Guidance for needle placement?: Yes    Images are saved and documented.  Approach:  Posterior  Location:  Shoulder  Site:  R glenohumeral  Medications:  40 mg triamcinolone acetonide 40 mg/mL  Patient tolerance:  Patient tolerated the procedure well with no immediate complications     Ultrasound guidance was used for needle localization. Images were saved and stored for documentation. The appropriate structures were visualized. Dynamic visualization of the needle was continuous throughout the procedures and maintained good position.     Hydrodistention of shoulder capsule was performed using the 1 cc kenalog, 5 cc lidocaine, 5 cc bupivacaine, and 14 cc normal saline to a goal of 25 cc.

## 2023-09-25 NOTE — PROCEDURES
Sports Medicine US - Guidance for Needle Placement    Date/Time: 9/25/2023 8:40 AM    Performed by: Sage Rodney MD  Authorized by: Sage Rodney MD  Preparation: Patient was prepped and draped in the usual sterile fashion.  Local anesthesia used: no    Anesthesia:  Local anesthesia used: no    Sedation:  Patient sedated: no    Patient tolerance: patient tolerated the procedure well with no immediate complications  Comments: Ultrasound guidance was used for needle localization. Images were saved and stored for documentation. The appropriate structures were visualized. Dynamic visualization of the needle was continuous throughout the procedures and maintained good position.

## 2023-09-25 NOTE — PROGRESS NOTES
Patient ID: Zahida Dodd  YOB: 1960  MRN: [id number]    Chief Complaint: right shoulder pain        History of Present Illness: Zahida Dodd is a right-hand dominant 63 y.o. female who presents today with right . Pt reports she seen and dx with rotator cuff tear. 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   The incident occurred more than 1 week ago (x 1 month; worse x 2 weeks). The injury mechanism is unknown. The pain is present in the upper right arm and right shoulder. The pain radiates to the right arm. The pain is at a severity of 9/10. The pain is severe. Pertinent negatives include no numbness. The symptoms are aggravated by lifting. She has tried NSAIDs for the symptoms. The treatment provided no relief.     Occupation: customer service      Past Medical History:   Past Medical History:   Diagnosis Date    Arthritis     Diverticulitis 02/09/2021    Hyperlipidemia      Past Surgical History:   Procedure Laterality Date    COLONOSCOPY  2014    repeat in 10    COLONOSCOPY N/A 5/10/2021    Procedure: COLONOSCOPY;  Surgeon: Cale Funk MD;  Location: [facility];  Service: Endoscopy;  Laterality: N/A;    HYSTERECTOMY  2004    cervix present, on HRT for 7 years age [age]    OOPHORECTOMY      REDUCTION OF BOTH BREASTS      TOTAL REDUCTION MAMMOPLASTY       Family History   Problem Relation Age of Onset    Heart disease Mother     Dementia Mother     Breast cancer Neg Hx     Ovarian cancer Neg Hx     Glaucoma Neg Hx     Colon cancer Neg Hx      Social History     Socioeconomic History    Marital status:    Tobacco Use    Smoking status: Never    Smokeless tobacco: Never   Substance and Sexual Activity    Alcohol use: Yes     Comment: rare    Drug use: Never    Sexual activity: Not Currently     Birth control/protection: See Surgical Hx     Social Determinants of Health     Stress: Stress Concern Present  (6/5/2019)    [facility] [place] of Occupational Health - Occupational Stress Questionnaire     Feeling of Stress : Very much     Medication List with Changes/Refills   Current Medications    ASCORBIC ACID, VITAMIN C, (VITAMIN C) 1000 MG TABLET    Take 1,000 mg by mouth once daily.    FLUTICASONE PROPIONATE (FLONASE) 50 MCG/ACTUATION NASAL SPRAY    fluticasone propionate 50 mcg/actuation nasal spray,suspension    [id number]-ALGAL OIL (METANX/FOLTANX RF) 3 MG-35 MG-2 MG -90.314 MG CAP    Take 1 capsule by mouth 2 (two) times a day.    LOTEPREDNOL (LOTEMAX) 0.5 % OPHTHALMIC SUSPENSION    Lotemax 0.5 % eye drops,suspension    MELATONIN 5 MG TBDL    Take 1 tablet by mouth every evening.    OMEGA-3 FATTY ACIDS/FISH OIL (FISH OIL-OMEGA-3 FATTY ACIDS) 300-1,000 MG CAPSULE    Take by mouth once daily.    RESTASIS 0.05 % OPHTHALMIC EMULSION        VITAMIN D (VITAMIN D3) 1000 UNITS TAB    Take 2,000 Units by mouth once daily.     Review of patient's allergies indicates:   Allergen Reactions    Penicillins Hives       Physical Exam:   Body mass index is 28.86 kg/m².    GENERAL: Well appearing, in no acute distress.  HEAD: Normocephalic and atraumatic.  ENT: External ears and nose grossly normal.  EYES: EOMI bilaterally  PULMONARY: Respirations are grossly even and non-labored.  NEURO: Awake, alert, and oriented x 3.  SKIN: No obvious rashes appreciated.  PSYCH: Mood & affect are appropriate.    Detailed MSK exam:     Right shoulder exam:   -ROM: abduction 30, forward flexion 30, external rotation 50, internal rotation 50  -empty can test guarded, resisted ER guarded, belly press guarded  -sutton test guarded, neers test guarded, whipple test guarded  -biceps load test negative, yerguson test negative, [place]'s test guarded  -sensation intact, pulses 2+  -TTP: AC joint, lateral cuff insertion, and posterior    Left shoulder exam:   -ROM: abduction 130, forward flexion 130, external rotation 80, internal rotation  70  -empty can test negative, resisted ER negative, belly press negative  -sutton test negative, neers test negative, whipple test negative  -biceps load test negative, yerguson test negative, Diggs's test negative  -sensation intact, pulses 2+  -TTP: none      Imaging:  X-ray Shoulder 2 or More Views Right  Narrative: EXAMINATION:  XR SHOULDER COMPLETE 2 OR MORE VIEWS RIGHT    CLINICAL HISTORY:  Pain in right shoulder    TECHNIQUE:  Two or three views of the right shoulder were preformed.    COMPARISON:  09/05/2019    FINDINGS:  No acute fracture or dislocation.  Moderate to severe AC joint arthropathy is noted.  Large amount of amorphous calcification seen in the expected location of the rotator cuff most consistent calcium hydroxyapatite deposition within the cuff.  Moderate AC joint arthropathy is noted.  Impression: 1.  As above    Electronically signed by: Harjit Yoon DO  Date:    09/25/2023  Time:    08:57        Relevant imaging results were reviewed and interpreted by me and per my read shows moderate AC arthritic changes, calcific tendinopathy.  This was discussed with the patient and / or family today.     Assessment:  Zahida Dodd is a 63 y.o. female presenting with chronic right shoulder pain.   History, physical and radiographs are consistent with a likely diagnosis of adhesive capsulitis, OA, calcific tendinopathy.   Plan: Steroid injection given today (see separate procedure note for details). We discussed the proper protocols after the injection such as no submerging pools, baths tubs, or hot tubs for 24 hr.  Showering is okay today.  We also discussed that blood sugars can be elevated after an injection and asked patient to properly checked her sugars over the next few days and contact their PCP if there are any concerns.  We discussed red flags such as fevers, chills, red, warm, tender joint at the area of injection to please seek medical care immediately.   PT referral. Continue  conservative management for pain.   Follow up 8 weeks. All questions answered.      Adhesive capsulitis of right shoulder  -     Large Joint Aspiration/Injection: R glenohumeral    Chronic right shoulder pain  -     Sports Medicine US - Guidance for Needle Placement    Primary osteoarthritis of right shoulder  -     Large Joint Aspiration/Injection: R glenohumeral    Calcific supraspinatus tendonitis         Ultrasound guidance was used for needle localization. Images were saved and stored for documentation. The appropriate structures were visualized. Dynamic visualization of the needle was continuous throughout the procedures and maintained good position.      A copy of today's visit note has been sent to the referring provider.     Electronically signed:  Sage Rodney MD, MPH  09/25/2023  9:07 AM

## 2023-09-25 NOTE — PATIENT INSTRUCTIONS
Assessment:  Zahida Dodd is a 63 y.o. female No chief complaint on file.      Encounter Diagnosis   Name Primary?    Chronic right shoulder pain Yes        Plan:  Referral sent to Physical therapy sang  Apply topical diclofenac (Voltaren) up to 4 times a day to the affected area.  It can be bought over the counter at any local pharmacy.    Please take Tylenol 1 Gram (2 extra-strength Tylenol tablets) up to 3 times a day.  Please to not take any extra doses of tylenol if you are scheduling in this manner.   Lidocaine patches can be bought over the counter  Patient can start ice/ heat as needed  Ultrasound/ guided Cortizone steroid injection given in right glenohumeral/  hydrodistention  We discussed the proper protocols after the injection such as no submerging pools, baths tubs, or hot tubs for 24 hr.  Showering is okay today.  We also discussed that blood sugars can be elevated after an injection and asked patient to properly checked her sugars over the next few days and contact their PCP if there are any concerns.  We discussed red flags such as fevers, chills, red, warm, tender joint at the area of injection to please seek medical care immediately.         Follow-up: 8 wks or sooner if there are any problems between now and then.    Thank you for choosing [facility] and Dr. Sage Rodney for your orthopedic & sports medicine care. It is our goal to provide you with exceptional care that will help keep you healthy, active, and get you back in the game.    Please do not hesitate to reach out to us via email, phone, or MyChart with any questions, concerns, or feedback.    If you felt that you received exemplary care today, please consider leaving us feedback on [facility] at:  [email or web address]    If you are experiencing pain/discomfort ,or have questions after 5pm and would like to be connected to the [facility]  [place] on-call team, please call this number and specify which Sports Medicine provider is treating you: [phone]

## 2023-09-29 ENCOUNTER — CLINICAL SUPPORT (OUTPATIENT)
Dept: REHABILITATION | Facility: HOSPITAL | Age: 63
End: 2023-09-29
Attending: STUDENT IN AN ORGANIZED HEALTH CARE EDUCATION/TRAINING PROGRAM
Payer: COMMERCIAL

## 2023-09-29 DIAGNOSIS — R68.89 DECREASED FUNCTIONAL ACTIVITY TOLERANCE: ICD-10-CM

## 2023-09-29 DIAGNOSIS — M25.511 RIGHT SHOULDER PAIN, UNSPECIFIED CHRONICITY: ICD-10-CM

## 2023-09-29 DIAGNOSIS — M75.31 CALCIFIC TENDINITIS OF RIGHT SHOULDER: ICD-10-CM

## 2023-09-29 DIAGNOSIS — M25.611 DECREASED ROM OF RIGHT SHOULDER: ICD-10-CM

## 2023-09-29 DIAGNOSIS — R29.898 DECREASED STRENGTH OF UPPER EXTREMITY: ICD-10-CM

## 2023-09-29 DIAGNOSIS — M75.01 ADHESIVE CAPSULITIS OF RIGHT SHOULDER: ICD-10-CM

## 2023-09-29 PROCEDURE — 97161 PT EVAL LOW COMPLEX 20 MIN: CPT | Mod: PN

## 2023-09-29 PROCEDURE — 97110 THERAPEUTIC EXERCISES: CPT | Mod: PN

## 2023-09-29 PROCEDURE — 97112 NEUROMUSCULAR REEDUCATION: CPT | Mod: PN

## 2023-09-29 NOTE — PLAN OF CARE
[facility]   Physical Therapy Initial Evaluation      Name: Zahida Dodd  Clinic Number: [id number]    Therapy Diagnosis:   Encounter Diagnoses   Name Primary?    Right shoulder pain, unspecified chronicity     Adhesive capsulitis of right shoulder     Calcific tendinitis of right shoulder     Decreased ROM of right shoulder     Decreased strength of upper extremity     Decreased functional activity tolerance         Physician: Sage Rodney MD    Physician Orders: PT Eval and Treat   Medical Diagnosis from Referral: M25.511 (ICD-10-CM) - Right shoulder pain, unspecified chronicity  M75.01 (ICD-10-CM) - Adhesive capsulitis of right shoulder  M75.31 (ICD-10-CM) - Calcific tendinitis of right shoulder  Evaluation Date: 9/29/2023  Authorization Period Expiration: 9/24/24  Plan of Care Expiration: 11/29/23  Progress Note Due: 10/29/23  Date of Surgery: NA  Visit # / Visits authorized: 1 / 1 Eval   FOTO: 1 / 3    Precautions: Class 1 Obesity, Anxiety     Time In: 8:00 AM  Time Out: 8:45 AM  Total Billable Time: 45 minutes    Subjective     Date of onset: 6 months ago pain got worse.    History of current condition - Zahida reports: that her right shoulder has been hurting more and more over the last few months the symptoms have gotten worse. The pain has been really bad but is localized to the right shoulder. The stiffness is really bad as well. She received an injection in the right shoulder on Monday of this week. The pain is somewhat better since receiving the injection but she is scared to lift the arm overhead. She is taking Tylenol every day for pain relief and alternates between using ice and heat. Has a lot of trouble reaching and lifting her right arm overhead. She is right hand dominant but has been using her left hand/arm to do everything as of lately. She was told to try therapy to see if that helps. She is scheduled to follow up with MD in about 8 weeks. No other  medical conditions to be concerned with at this time.     Falls: None    Imaging: xray of Shoulder:  FINDINGS:  No acute fracture or dislocation.  Moderate to severe AC joint arthropathy is noted.  Large amount of amorphous calcification seen in the expected location of the rotator cuff most consistent calcium hydroxyapatite deposition within the cuff.  Moderate AC joint arthropathy is noted.     Impression:     1.  As above        Electronically signed by: Harjit Yoon DO  Date:                                            09/25/2023  Time:                                           08:57    Prior Therapy: yes for her knees in the past   Social History: lives with their family - spouse  Occupation:  - lots of sitting but works from home. Likes to walk daily.  Prior Level of Function: independent with ADL's  Current Level of Function: difficulty reaching overhead and lifting    Pain:  Current 3/10, worst 10/10, best 3/10   Location: right shoulder   Description: Aching, Tight, and Stiff  Aggravating Factors: Lifting and Reaching overhead, sleeping on shoulder  Easing Factors: medication and ice/heat    Patients goals: decrease pain, improve mobility and strength in shoulder     Medical History:   Past Medical History:   Diagnosis Date    Arthritis     Diverticulitis 02/09/2021    Hyperlipidemia      Surgical History:   Zahida Dodd  has a past surgical history that includes Hysterectomy (2004); Oophorectomy; Reduction of both breasts; Colonoscopy (2014); Total Reduction Mammoplasty; and Colonoscopy (N/A, 5/10/2021).    Medications:   Zahida has a current medication list which includes the following prescription(s): ascorbic acid (vitamin c), fluticasone propionate, [id number]-algal oil, loteprednol, melatonin, fish oil-omega-3 fatty acids, restasis, and vitamin d.    Allergies:   Review of patient's allergies indicates:   Allergen Reactions    Penicillins Hives       Objective      Posture: FAIR - slightly rounded shoulders with no obvious signs of distress. Very pleasant female patient.    Functional mobility:   Gait: non-antalgic in nature- no AD used. Decreased arm swing on right. Normal constantine and step length observed.    Shoulder Active Range of Motion:   Shoulder Right Left   Flexion   95* 180   Abduction   90* 180   ER at 45   45* 90   IR at 45 60* 70      Shoulder Passive Range of Motion:   Shoulder Right Left   Flexion   115* 180   Abduction   95* 180   ER at 45   50* 90   IR at 45   70* 70     Cervical Range of Motion: slightly limited into extension    Strength:   Right Left   Flexion  3/5 (unable to hold position to test) 5/5   Abduction 3+/5* 5/5   Scaption 3+/5* 5/5   Shoulder ER at side 4-/5* 4+/5   Shoulder IR at side  4/5 4+/5   Middle trap 3+/5 4/5   Lower trap Unable to perform in testing position 4/5     Pain = *    Special Tests:    Ligamentous Stability    Sharp-Marquez -     Distraction -   Compression -   Spurlings -      Right Left   Espinal- Tyree - -   Neer's - -   Full Can - -      Right Left   Drop Arm Test - -     Joint Mobility: decreased at the right glenohumeral joint due to pain and muscle guarding compared to the contralateral side    Palpation: TTP along the right upper traps and deltoid    Flexibility: decreased along the UE musculature on the right side compared to the left    Intake Outcome Measure for FOTO Shoulder Survey    Therapist reviewed FOTO scores for Zahida Dodd on 9/29/2023.   FOTO report - see Media section or FOTO account episode details.    Intake Score: 77.5%     Treatment     Total Treatment time (time-based codes) separate from Evaluation: 16 minutes     Zahida received the treatments listed below:      Therapeutic Exercises to develop strength, endurance, ROM, and flexibility for 8 minutes including:    Supine Shoulder Flexion x10 reps (3s holds)  Shoulder Brugger's x10 reps + YTB    Neuromuscular  Re-education activities to improve: Balance, Coordination, Kinesthetic, Sense, Proprioception, and Posture for 8 minutes. The following activities were included:    Seated Scap Retractions x10 reps (3s holds)  Supine Serratus Punches x10 reps B    Patient Education and Home Exercises     Education provided:   - Home Exercise Program Administration and Review  - Post Exercise Soreness  - Maintaining a pain free range of motion with all activities  - Anatomy/Physiology of the Shoulder and the surrounding musculature    Written Home Exercises Provided: yes. Exercises were reviewed and Zahida was able to demonstrate them prior to the end of the session.  Zahida demonstrated good  understanding of the education provided. See EMR under Patient Instructions for exercises provided during therapy sessions.    Assessment     Zahida is a 63 y.o. female referred to outpatient Physical Therapy with a medical diagnosis of Right shoulder pain, unspecified chronicity, adhesive capsulitis of right shoulder, and Calcific tendinitis of right shoulder. Patient presents with decreased right shoulder range of motion, decreased UE strength, decreased tolerance to activity, increased pain with movement of the RUE, and decreased functional mobility overall. Treatment will focus on restoring mobility to the right glenohumeral joint while preventing pain exacerbation as well as improving periscapular and rotator cuff stability/strength.    Patient prognosis is Good.   Patient will benefit from skilled outpatient Physical Therapy to address the deficits stated above and in the chart below, provide patient /family education, and to maximize patientt's level of independence.     Plan of care discussed with patient: Yes  Patient's spiritual, cultural and educational needs considered and patient is agreeable to the plan of care and goals as stated below:     Anticipated Barriers for therapy: none stated    Medical Necessity is demonstrated by the  following  History  Co-morbidities and personal factors that may impact the plan of care [] LOW: no personal factors / co-morbidities  [x] MODERATE: 1-2 personal factors / co-morbidities  [] HIGH: 3+ personal factors / co-morbidities    Moderate / High Support Documentation:   Co-morbidities affecting plan of care: Class 1 Obesity, Anxiety    Personal Factors:   no deficits     Examination  Body Structures and Functions, activity limitations and participation restrictions that may impact the plan of care [x] LOW: addressing 1-2 elements  [] MODERATE: 3+ elements  [] HIGH: 4+ elements (please support below)    Moderate / High Support Documentation: NA     Clinical Presentation [x] LOW: stable  [] MODERATE: Evolving  [] HIGH: Unstable     Decision Making/ Complexity Score: low       Goals:  Short Term Goals: 4 weeks   - Patient will demonstrate improved right shoulder range of motion, especially into right shoulder abduction, by at least 15 degrees for increased functional mobility overall.  - Patient will demonstrate improved ability to reach overhead without resistance for increased tolerance of ADL's.  - Patient will demonstrate improved UE strength, especially into right shoulder flexion by at least 1/2 grade via MMT for increased stability and support with functional tasks.    Long Term Goals: 8 weeks   - Patient will demonstrate improved right shoulder range of motion, especially into right shoulder flexion, by at least 15 degrees for increased functional mobility overall.  - Patient will demonstrate improved ability to reach overhead with minimal resistance of at least #3 for increased tolerance of ADL's.  - Patient will demonstrate improved UE strength, especially into right shoulder abduction/scaption by at least 1/2 grade via MMT for increased stability and support with functional tasks.  - Patient will demonstrate an improved FOTO score that is greater than or equal to the predicted value for increased  ability to perform ADL's.  - Patient will demonstrate independence with Home Exercise Program for continued improvements outside the clinical setting.    Plan     Plan of care Certification: 9/29/2023 to 11/29/23.    Outpatient Physical Therapy 2 times weekly for 8 weeks to include the following interventions: Aquatic Therapy, Cervical/Lumbar Traction, Electrical Stimulation IFC/TENS/PREMOD, Gait Training, Manual Therapy, Moist Heat/ Ice, Neuromuscular Re-ed, Patient Education, Self Care, Therapeutic Activities, Therapeutic Exercise, Ultrasound, and Dry Needling (by a certified therapist).     This patient CAN be treated by a PTA.    Leigha Mccauley, PT, DPT, Cert. DN      Physician's Signature: _________________________________________ Date: ________________

## 2023-09-29 NOTE — PROGRESS NOTES
[facility]   Physical Therapy Treatment Note      Name: Zahida Saleh [facility]  Clinic Number: [id number]    Therapy Diagnosis:   Encounter Diagnoses   Name Primary?    Decreased ROM of right shoulder Yes    Decreased strength of upper extremity     Decreased functional activity tolerance      Physician: Sage Rodney MD    Visit Date: 10/2/2023    Physician Orders: PT Eval and Treat   Medical Diagnosis from Referral: M25.511 (ICD-10-CM) - Right shoulder pain, unspecified chronicity  M75.01 (ICD-10-CM) - Adhesive capsulitis of right shoulder  M75.31 (ICD-10-CM) - Calcific tendinitis of right shoulder  Evaluation Date: 9/29/2023  Authorization Period Expiration: 12/31/23  Plan of Care Expiration: 11/29/23  Progress Note Due: 10/29/23  Date of Surgery: NA  Visit # / Visits authorized: 1 / 20 (1 / 1 Eval)  FOTO: 1 / 3     Precautions: Class 1 Obesity, Anxiety      Time In: 7:45 AM  Time Out: 8:30 AM  Total Billable Time: 45 minutes    PTA Visit #: 0/5     Subjective     Patient reports: that she is feeling better since doing the exercises at home. Still having some pain and discomfort but it seems to be a little better.    She was compliant with home exercise program.  Response to previous treatment: no adverse reactions  Functional change: in progress    Pain: 4/10  Location: right shoulder      Objective      Objective Measures updated at progress report unless specified.     Treatment     Zahida received the treatments listed below:      Therapeutic Exercises to develop strength, endurance, ROM, and flexibility for 23 minutes including:     UBE x2 min (forward/backward)  Seated Pulley's x2 min (flexion)  Standing Doorway Pec Stretch 10x10s B  Standing Wall Slides x10 reps B  Supine Shoulder Flexion 2x10 reps (3s holds)  Shoulder Brugger's 3x10 reps + YTB  Standing Shoulder Extension 3x10 reps + #1 dowel  Side-Lying Scap Retractions 2x10 reps (R only) (3s holds)  Side-lying Shoulder ER  2x10 reps (R only)      Neuromuscular Re-education activities to improve: Balance, Coordination, Kinesthetic, Sense, Proprioception, and Posture for 14 minutes. The following activities were included:     Seated Scap Retractions 2x10 reps (3s holds)  Supine Serratus Punches 2x10 reps B  Supine Shoulder Horizontal Abduction x5 reps B  Supine Shoulder Circles x20 reps each (clockwise and counter clockwise) + red med ball (R only)      Therapeutic Activities to improve functional performance for 8 minutes, including:    Standing Shoulder Rows 2x10 reps B + RTB  Standing Shoulder Extensions 2x10 reps B + RTB      manual therapy techniques:  were applied to the:  for  minutes, including:    Patient Education and Home Exercises       Education provided:   - Home Exercise Program Review  - Post Exercise Soreness  - Maintaining a pain free range of motion with all activities  - Anatomy/Physiology of the Shoulder and the surrounding musculature    Written Home Exercises Provided: Patient instructed to cont prior HEP. Exercises were reviewed and Zahida was able to demonstrate them prior to the end of the session.  Zahida demonstrated good  understanding of the education provided. See Electronic Medical Record under Patient Instructions for exercises provided during therapy sessions    Assessment     Emphasis on right shoulder range of motion and periscapular strengthening this date while preventing exacerbation of symptoms. Horizontal shoulder abduction remains difficult for the patient due to pain and limited mobility. Standing flexion against gravity also remains limited. Encouraged patient to continue with performance of Home Exercise Program.     Zahida Is progressing well towards her goals.   Patient prognosis is Good.     Patient will continue to benefit from skilled outpatient physical therapy to address the deficits listed in the problem list box on initial evaluation, provide pt/family education and to maximize pt's  level of independence in the home and community environment.     Patient's spiritual, cultural and educational needs considered and pt agreeable to plan of care and goals.     Anticipated barriers to physical therapy: none stated    Goals:   Short Term Goals: 4 weeks   - Patient will demonstrate improved right shoulder range of motion, especially into right shoulder abduction, by at least 15 degrees for increased functional mobility overall. (Progressing, not met)  - Patient will demonstrate improved ability to reach overhead without resistance for increased tolerance of ADL's. (Progressing, not met)  - Patient will demonstrate improved UE strength, especially into right shoulder flexion by at least 1/2 grade via MMT for increased stability and support with functional tasks. (Progressing, not met)     Long Term Goals: 8 weeks   - Patient will demonstrate improved right shoulder range of motion, especially into right shoulder flexion, by at least 15 degrees for increased functional mobility overall. (Progressing, not met)  - Patient will demonstrate improved ability to reach overhead with minimal resistance of at least #3 for increased tolerance of ADL's. (Progressing, not met)  - Patient will demonstrate improved UE strength, especially into right shoulder abduction/scaption by at least 1/2 grade via MMT for increased stability and support with functional tasks. (Progressing, not met)  - Patient will demonstrate an improved FOTO score that is greater than or equal to the predicted value for increased ability to perform ADL's. (Progressing, not met)  - Patient will demonstrate independence with Home Exercise Program for continued improvements outside the clinical setting. (Progressing, not met)    Plan     Continue with established POC for improved functional mobility overall.    Leigha Mccauley, PT, DPT, Cert. DN

## 2023-10-02 ENCOUNTER — CLINICAL SUPPORT (OUTPATIENT)
Dept: REHABILITATION | Facility: HOSPITAL | Age: 63
End: 2023-10-02
Payer: COMMERCIAL

## 2023-10-02 DIAGNOSIS — R29.898 DECREASED STRENGTH OF UPPER EXTREMITY: ICD-10-CM

## 2023-10-02 DIAGNOSIS — M25.611 DECREASED ROM OF RIGHT SHOULDER: Primary | ICD-10-CM

## 2023-10-02 DIAGNOSIS — R68.89 DECREASED FUNCTIONAL ACTIVITY TOLERANCE: ICD-10-CM

## 2023-10-02 PROCEDURE — 97530 THERAPEUTIC ACTIVITIES: CPT | Mod: PN

## 2023-10-02 PROCEDURE — 97110 THERAPEUTIC EXERCISES: CPT | Mod: PN

## 2023-10-02 PROCEDURE — 97112 NEUROMUSCULAR REEDUCATION: CPT | Mod: PN

## 2023-10-04 NOTE — PROGRESS NOTES
[facility]   Physical Therapy Treatment Note      Name: Zahida Saleh [facility]  Clinic Number: [id number]    Therapy Diagnosis:   Encounter Diagnoses   Name Primary?    Decreased ROM of right shoulder Yes    Decreased strength of upper extremity     Decreased functional activity tolerance      Physician: Sage Rodney MD    Visit Date: 10/6/2023    Physician Orders: PT Eval and Treat   Medical Diagnosis from Referral: M25.511 (ICD-10-CM) - Right shoulder pain, unspecified chronicity  M75.01 (ICD-10-CM) - Adhesive capsulitis of right shoulder  M75.31 (ICD-10-CM) - Calcific tendinitis of right shoulder  Evaluation Date: 9/29/2023  Authorization Period Expiration: 12/31/23  Plan of Care Expiration: 11/29/23  Progress Note Due: 10/29/23  Date of Surgery: NA  Visit # / Visits authorized: 2 / 20 (1 / 1 Eval)  FOTO: 1 / 3     Precautions: Class 1 Obesity, Anxiety      Time In: 8:00 AM  Time Out: 8:45 AM  Total Billable Time: 45 minutes    PTA Visit #: 0/5     Subjective     Patient reports: that she is doing okay this morning so far. Had a little discomfort after last session but things seem to be getting better overall.    She was compliant with home exercise program.  Response to previous treatment: mild discomfort that improved over time  Functional change: in progress    Pain: 0/10  Location: right shoulder      Objective      Objective Measures updated at progress report unless specified.     Treatment     Zahida received the treatments listed below:      Therapeutic Exercises to develop strength, endurance, ROM, and flexibility for 23 minutes including:     UBE x2 min (forward/backward)  Seated Pulley's x2 min (flexion)  Standing Doorway Pec Stretch 10x10s B  Standing Wall Slides x10 reps B  Standing Open Books x10 reps B (no resistance)  Supine Shoulder Flexion 2x10 reps (3s holds) + #1 Dowel  Shoulder Brugger's 3x10 reps + YTB  Standing Shoulder Extension 3x10 reps + #1  dowel  Side-Lying Scap Retractions 2x10 reps (R only) (3s holds)  Side-lying Shoulder ER 2x10 reps (R only)  Side-Lying Shoulder Abduction x10 reps (R only)      Neuromuscular Re-education activities to improve: Balance, Coordination, Kinesthetic, Sense, Proprioception, and Posture for 14 minutes. The following activities were included:     Seated Scap Retractions 2x10 reps (3s holds)  Supine Unilateral Serratus Punches 2x10 reps B  Supine Shoulder Horizontal Abduction x5 reps B  Supine Shoulder Circles x20 reps each (clockwise and counter clockwise) + red med ball (R only)  Standing Lateral Scap Taps x10 reps B + YTB    Therapeutic Activities to improve functional performance for 8 minutes, including:    Standing Shoulder Rows 2x10 reps B + RTB  Standing Shoulder Extensions 2x10 reps B + RTB      manual therapy techniques:  were applied to the:  for  minutes, including:    Patient Education and Home Exercises       Education provided:   - Home Exercise Program Review  - Post Exercise Soreness  - Maintaining a pain free range of motion with all activities  - Anatomy/Physiology of the Shoulder and the surrounding musculature    Written Home Exercises Provided: Patient instructed to cont prior HEP. Exercises were reviewed and Zahida was able to demonstrate them prior to the end of the session.  Zahida demonstrated good  understanding of the education provided. See Electronic Medical Record under Patient Instructions for exercises provided during therapy sessions    Assessment     Emphasis continues to be placed on UE range of motion and periscapular stability. Encouraged a pain free range of motion with all activities this date.     Zahida Is progressing well towards her goals.   Patient prognosis is Good.     Patient will continue to benefit from skilled outpatient physical therapy to address the deficits listed in the problem list box on initial evaluation, provide pt/family education and to maximize pt's level of  independence in the home and community environment.     Patient's spiritual, cultural and educational needs considered and pt agreeable to plan of care and goals.     Anticipated barriers to physical therapy: none stated    Goals:   Short Term Goals: 4 weeks   - Patient will demonstrate improved right shoulder range of motion, especially into right shoulder abduction, by at least 15 degrees for increased functional mobility overall. (Progressing, not met)  - Patient will demonstrate improved ability to reach overhead without resistance for increased tolerance of ADL's. (Progressing, not met)  - Patient will demonstrate improved UE strength, especially into right shoulder flexion by at least 1/2 grade via MMT for increased stability and support with functional tasks. (Progressing, not met)     Long Term Goals: 8 weeks   - Patient will demonstrate improved right shoulder range of motion, especially into right shoulder flexion, by at least 15 degrees for increased functional mobility overall. (Progressing, not met)  - Patient will demonstrate improved ability to reach overhead with minimal resistance of at least #3 for increased tolerance of ADL's. (Progressing, not met)  - Patient will demonstrate improved UE strength, especially into right shoulder abduction/scaption by at least 1/2 grade via MMT for increased stability and support with functional tasks. (Progressing, not met)  - Patient will demonstrate an improved FOTO score that is greater than or equal to the predicted value for increased ability to perform ADL's. (Progressing, not met)  - Patient will demonstrate independence with Home Exercise Program for continued improvements outside the clinical setting. (Progressing, not met)    Plan     Continue with established POC for improved functional mobility overall.    Leigha Mccauley, PT, DPT, Cert. DN

## 2023-10-06 ENCOUNTER — CLINICAL SUPPORT (OUTPATIENT)
Dept: REHABILITATION | Facility: HOSPITAL | Age: 63
End: 2023-10-06
Payer: COMMERCIAL

## 2023-10-06 DIAGNOSIS — R29.898 DECREASED STRENGTH OF UPPER EXTREMITY: ICD-10-CM

## 2023-10-06 DIAGNOSIS — M25.611 DECREASED ROM OF RIGHT SHOULDER: Primary | ICD-10-CM

## 2023-10-06 DIAGNOSIS — R68.89 DECREASED FUNCTIONAL ACTIVITY TOLERANCE: ICD-10-CM

## 2023-10-06 PROCEDURE — 97110 THERAPEUTIC EXERCISES: CPT | Mod: PN

## 2023-10-06 PROCEDURE — 97112 NEUROMUSCULAR REEDUCATION: CPT | Mod: PN

## 2023-10-06 PROCEDURE — 97530 THERAPEUTIC ACTIVITIES: CPT | Mod: PN

## 2023-10-06 NOTE — PROGRESS NOTES
[facility]   Physical Therapy Treatment Note      Name: Zahida Dodd  Clinic Number: [id number]    Therapy Diagnosis:   Encounter Diagnoses   Name Primary?    Decreased ROM of right shoulder Yes    Decreased strength of upper extremity     Decreased functional activity tolerance        Physician: Sage Rodney MD    Visit Date: 10/9/2023    Physician Orders: PT Eval and Treat   Medical Diagnosis from Referral: M25.511 (ICD-10-CM) - Right shoulder pain, unspecified chronicity  M75.01 (ICD-10-CM) - Adhesive capsulitis of right shoulder  M75.31 (ICD-10-CM) - Calcific tendinitis of right shoulder  Evaluation Date: 9/29/2023  Authorization Period Expiration: 12/31/23  Plan of Care Expiration: 11/29/23  Progress Note Due: 10/29/23  Date of Surgery: NA  Visit # / Visits authorized: 3 / 20 (1 / 1 Eval)  FOTO: 2 / 3     Precautions: Class 1 Obesity, Anxiety      Time In: 7:45 AM  Time Out: 8:30 AM  Total Billable Time: 45 minutes    PTA Visit #: 0/5     Subjective     Patient reports: that she can tell things are getting better overall. Taking less Tylenol and has noticed more mobility with less pain. Continues to perform her exercises at home. Did well after last session.    She was compliant with home exercise program.  Response to previous treatment: mild discomfort that improved over time  Functional change: in progress    Pain: 0/10  Location: right shoulder      Objective      Objective Measures updated at progress report unless specified.     Intake Outcome Measure for FOTO Shoulder Survey     Therapist reviewed FOTO scores for aZhida Dodd on 10/9/2023.   FOTO report - see Media section or FOTO account episode details.     Intake Score: 36.7%      Treatment     Zahida received the treatments listed below:      Therapeutic Exercises to develop strength, endurance, ROM, and flexibility for 23 minutes including:     UBE x2 min (forward/backward)  Seated Pulley's x2 min  (flexion + scaption)  Standing Doorway Pec Stretch 10x10s B  Standing Wall Slides 2x10 reps B  Standing Shoulder Brugger's 3x10 reps + YTB  Side-Lying Open Books x10 reps B (no resistance)  Supine Shoulder Flexion 2x10 reps (3s holds) + #1 Dowel  Standing Shoulder Extension 3x10 reps + #1 dowel  Side-Lying Scap Retractions 2x10 reps (R only) (3s holds)  Side-lying Shoulder ER 2x10 reps (R only)  Side-Lying Shoulder Abduction 2x10 reps (R only)      Neuromuscular Re-education activities to improve: Balance, Coordination, Kinesthetic, Sense, Proprioception, and Posture for 14 minutes. The following activities were included:     Seated Scap Retractions 2x10 reps (3s holds)  Supine Unilateral Serratus Punches 2x10 reps B (R only) + #5 KB  Supine Shoulder Horizontal Abduction x5 reps B  Supine Shoulder Circles x20 reps each (clockwise and counter clockwise) + red med ball (R only)  Standing Lateral Scap Taps x10 reps B + YTB    Therapeutic Activities to improve functional performance for 8 minutes, including:    Standing Shoulder Rows 3x10 reps B + BTB  Standing Shoulder Extensions 3x10 reps B + BTB      manual therapy techniques:  were applied to the:  for  minutes, including:    Patient Education and Home Exercises       Education provided:   - Home Exercise Program Review  - Post Exercise Soreness  - Maintaining a pain free range of motion with all activities  - Anatomy/Physiology of the Shoulder and the surrounding musculature    Written Home Exercises Provided: Patient instructed to cont prior HEP. Exercises were reviewed and Zahida was able to demonstrate them prior to the end of the session.  Zahida demonstrated good  understanding of the education provided. See Electronic Medical Record under Patient Instructions for exercises provided during therapy sessions    Assessment     Patient with positive response to therapy. Noted improved mobility at the right glenohumeral joint overall with minimal to no exacerbation  of symptoms. Able to progress reps and or intensity of some exercises without complication. Noted improved FOTO score - indicative of increased mobility overall. Will continue to progress patient next session.    Zahida Is progressing well towards her goals.   Patient prognosis is Good.     Patient will continue to benefit from skilled outpatient physical therapy to address the deficits listed in the problem list box on initial evaluation, provide pt/family education and to maximize pt's level of independence in the home and community environment.     Patient's spiritual, cultural and educational needs considered and pt agreeable to plan of care and goals.     Anticipated barriers to physical therapy: none stated    Goals:   Short Term Goals: 4 weeks   - Patient will demonstrate improved right shoulder range of motion, especially into right shoulder abduction, by at least 15 degrees for increased functional mobility overall. (Progressing, not met)  - Patient will demonstrate improved ability to reach overhead without resistance for increased tolerance of ADL's. (Progressing, not met)  - Patient will demonstrate improved UE strength, especially into right shoulder flexion by at least 1/2 grade via MMT for increased stability and support with functional tasks. (Progressing, not met)     Long Term Goals: 8 weeks   - Patient will demonstrate improved right shoulder range of motion, especially into right shoulder flexion, by at least 15 degrees for increased functional mobility overall. (Progressing, not met)  - Patient will demonstrate improved ability to reach overhead with minimal resistance of at least #3 for increased tolerance of ADL's. (Progressing, not met)  - Patient will demonstrate improved UE strength, especially into right shoulder abduction/scaption by at least 1/2 grade via MMT for increased stability and support with functional tasks. (Progressing, not met)  - Patient will demonstrate an improved FOTO  score that is greater than or equal to the predicted value for increased ability to perform ADL's. (Progressing, not met)  - Patient will demonstrate independence with Home Exercise Program for continued improvements outside the clinical setting. (Progressing, not met)    Plan     Continue with established POC for improved functional mobility overall.    Leigha Mccauley, PT, DPT, Cert. DN

## 2023-10-09 ENCOUNTER — CLINICAL SUPPORT (OUTPATIENT)
Dept: REHABILITATION | Facility: HOSPITAL | Age: 63
End: 2023-10-09
Payer: COMMERCIAL

## 2023-10-09 DIAGNOSIS — R68.89 DECREASED FUNCTIONAL ACTIVITY TOLERANCE: ICD-10-CM

## 2023-10-09 DIAGNOSIS — R29.898 DECREASED STRENGTH OF UPPER EXTREMITY: ICD-10-CM

## 2023-10-09 DIAGNOSIS — M25.611 DECREASED ROM OF RIGHT SHOULDER: Primary | ICD-10-CM

## 2023-10-09 PROCEDURE — 97112 NEUROMUSCULAR REEDUCATION: CPT | Mod: PN

## 2023-10-09 PROCEDURE — 97530 THERAPEUTIC ACTIVITIES: CPT | Mod: PN

## 2023-10-09 PROCEDURE — 97110 THERAPEUTIC EXERCISES: CPT | Mod: PN

## 2023-10-10 NOTE — PROGRESS NOTES
[facility]   Physical Therapy Treatment Note      Name: Zahida Saleh [facility]  Clinic Number: [id number]    Therapy Diagnosis:   Encounter Diagnoses   Name Primary?    Decreased ROM of right shoulder Yes    Decreased strength of upper extremity     Decreased functional activity tolerance      Physician: Sage Rodney MD    Visit Date: 10/11/2023    Physician Orders: PT Eval and Treat   Medical Diagnosis from Referral: M25.511 (ICD-10-CM) - Right shoulder pain, unspecified chronicity  M75.01 (ICD-10-CM) - Adhesive capsulitis of right shoulder  M75.31 (ICD-10-CM) - Calcific tendinitis of right shoulder  Evaluation Date: 9/29/2023  Authorization Period Expiration: 12/31/23  Plan of Care Expiration: 11/29/23  Progress Note Due: 10/29/23  Date of Surgery: NA  Visit # / Visits authorized: 4 / 20 (1 / 1 Eval)  FOTO: 2 / 3     Precautions: Class 1 Obesity, Anxiety      Time In: 7:45 AM  Time Out: 8:30 AM  Total Billable Time: 45 minutes    PTA Visit #: 0/5     Subjective     Patient reports: that she is doing okay this morning. Only having a little discomfort in the shoulder but otherwise doing fine. No problems after last session. Was able to order a pulley system for home.    She was compliant with home exercise program.  Response to previous treatment: mild soreness that improved over time  Functional change: in progress    Pain: 3/10  Location: right shoulder      Objective      Objective Measures updated at progress report unless specified.     Treatment     Zahida received the treatments listed below:      Therapeutic Exercises to develop strength, endurance, ROM, and flexibility for 23 minutes including:     UBE x2 min (forward/backward)  Seated Pulley's x2 min (flexion + scaption)  Standing Doorway Pec Stretch 10x10s B  Standing Wall Slides 2x10 reps B  Standing Shoulder Brugger's 3x10 reps + YTB  Side-Lying Open Books x10 reps B (no resistance)  Supine Shoulder Flexion 2x10 reps (3s  holds) + #1 Dowel  Standing Shoulder Extension 3x10 reps + #2 dowel  Side-Lying Scap Retractions 2x10 reps (R only) (3s holds)  Side-Lying Shoulder Abduction 3x10 reps (R only)      Neuromuscular Re-education activities to improve: Balance, Coordination, Kinesthetic, Sense, Proprioception, and Posture for 14 minutes. The following activities were included:     Seated Scap Retractions 3x10 reps (3s holds)  Supine Unilateral Serratus Punches 2x10 reps B (R only) + #5 KB upside down  Supine Shoulder Horizontal Abduction x10 reps B +YTB  Supine Shoulder Circles x30 reps each (clockwise and counter clockwise) + red med ball (R only)  Standing Lateral Scap Taps x10 reps B + YTB  Standing Shoulder ER 2x10 reps + YTB - towel under arm    Therapeutic Activities to improve functional performance for 8 minutes, including:    Standing Shoulder Rows 3x10 reps B + BTB  Standing Shoulder Extensions 3x10 reps B + BTB      manual therapy techniques:  were applied to the:  for  minutes, including:    Patient Education and Home Exercises       Education provided:   - Home Exercise Program Review  - Post Exercise Soreness  - Maintaining a pain free range of motion with all activities  - Anatomy/Physiology of the Shoulder and the surrounding musculature    Written Home Exercises Provided: Patient instructed to cont prior HEP. Exercises were reviewed and Zahida was able to demonstrate them prior to the end of the session.  Zahida demonstrated good  understanding of the education provided. See Electronic Medical Record under Patient Instructions for exercises provided during therapy sessions    Assessment     Patient tolerated all prescribed exercises well. R shoulder range of motion is improving gradually. Pain management continues to be key while also promoting functional strengthening. Introduced active shoulder ER - emphasis on mobility, proper mechanics, and rotator cuff strengthening - no adverse reactions reported. Will continue to  progress patient as able next session.    Zahida Is progressing well towards her goals.   Patient prognosis is Good.     Patient will continue to benefit from skilled outpatient physical therapy to address the deficits listed in the problem list box on initial evaluation, provide pt/family education and to maximize pt's level of independence in the home and community environment.     Patient's spiritual, cultural and educational needs considered and pt agreeable to plan of care and goals.     Anticipated barriers to physical therapy: none stated    Goals:   Short Term Goals: 4 weeks   - Patient will demonstrate improved right shoulder range of motion, especially into right shoulder abduction, by at least 15 degrees for increased functional mobility overall. (Progressing, not met)  - Patient will demonstrate improved ability to reach overhead without resistance for increased tolerance of ADL's. (Progressing, not met)  - Patient will demonstrate improved UE strength, especially into right shoulder flexion by at least 1/2 grade via MMT for increased stability and support with functional tasks. (Progressing, not met)     Long Term Goals: 8 weeks   - Patient will demonstrate improved right shoulder range of motion, especially into right shoulder flexion, by at least 15 degrees for increased functional mobility overall. (Progressing, not met)  - Patient will demonstrate improved ability to reach overhead with minimal resistance of at least #3 for increased tolerance of ADL's. (Progressing, not met)  - Patient will demonstrate improved UE strength, especially into right shoulder abduction/scaption by at least 1/2 grade via MMT for increased stability and support with functional tasks. (Progressing, not met)  - Patient will demonstrate an improved FOTO score that is greater than or equal to the predicted value for increased ability to perform ADL's. (Progressing, not met)  - Patient will demonstrate independence with Home  Exercise Program for continued improvements outside the clinical setting. (Progressing, not met)    Plan     Continue with established POC for improved functional mobility overall.    Leigha Mccauley, PT, DPT, Cert. DN

## 2023-10-11 ENCOUNTER — CLINICAL SUPPORT (OUTPATIENT)
Dept: REHABILITATION | Facility: HOSPITAL | Age: 63
End: 2023-10-11
Payer: COMMERCIAL

## 2023-10-11 DIAGNOSIS — M25.611 DECREASED ROM OF RIGHT SHOULDER: Primary | ICD-10-CM

## 2023-10-11 DIAGNOSIS — R68.89 DECREASED FUNCTIONAL ACTIVITY TOLERANCE: ICD-10-CM

## 2023-10-11 DIAGNOSIS — R29.898 DECREASED STRENGTH OF UPPER EXTREMITY: ICD-10-CM

## 2023-10-11 PROCEDURE — 97530 THERAPEUTIC ACTIVITIES: CPT | Mod: PN

## 2023-10-11 PROCEDURE — 97110 THERAPEUTIC EXERCISES: CPT | Mod: PN

## 2023-10-11 PROCEDURE — 97112 NEUROMUSCULAR REEDUCATION: CPT | Mod: PN

## 2023-10-12 NOTE — PROGRESS NOTES
[facility]   Physical Therapy Treatment Note      Name: Zahida Saleh [facility]  Clinic Number: [id number]    Therapy Diagnosis:   Encounter Diagnoses   Name Primary?    Decreased ROM of right shoulder Yes    Decreased strength of upper extremity     Decreased functional activity tolerance      Physician: Sage Rodney MD    Visit Date: 10/16/2023    Physician Orders: PT Eval and Treat   Medical Diagnosis from Referral: M25.511 (ICD-10-CM) - Right shoulder pain, unspecified chronicity  M75.01 (ICD-10-CM) - Adhesive capsulitis of right shoulder  M75.31 (ICD-10-CM) - Calcific tendinitis of right shoulder  Evaluation Date: 9/29/2023  Authorization Period Expiration: 12/31/23  Plan of Care Expiration: 11/29/23  Progress Note Due: 10/29/23  Date of Surgery: NA  Visit # / Visits authorized: 5 / 20 (1 / 1 Eval)  FOTO: 2 / 3     Precautions: Class 1 Obesity, Anxiety      Time In: 7:45 AM  Time Out: 8:30 AM  Total Billable Time: 45 minutes    PTA Visit #: 0/5     Subjective     Patient reports: that she cleaned her house yesterday and did okay for the most part even though she tried to use more of her left side. No problems after last session.     She was compliant with home exercise program.  Response to previous treatment: mild soreness that improved over time  Functional change: in progress    Pain: 2/10  Location: right shoulder      Objective      Objective Measures updated at progress report unless specified.     Treatment     Zahida received the treatments listed below:      Therapeutic Exercises to develop strength, endurance, ROM, and flexibility for 23 minutes including:     UBE x2 min (forward/backward)  UBE Single Arm x30s forward/backward  Seated Pulley's x2 min (flexion + scaption)  Standing Doorway Pec Stretch 10x10s B  Standing Wall Slides 2x10 reps B  Standing Shoulder Brugger's 3x10 reps + RTB  Side-Lying Open Books x10 reps B (no resistance)  Supine Shoulder Flexion 2x10 reps  (3s holds) + #2 Dowel  Supine Unilateral Shoulder Flexion 2x10 reps (RUE only) + #2 DB  Standing Shoulder Extension 3x10 reps + #2 dowel  Side-Lying Scap Retractions 2x10 reps (R only) (3s holds)  Side-Lying Shoulder Abduction 2x10 reps (R only) + #2 DB      Neuromuscular Re-education activities to improve: Balance, Coordination, Kinesthetic, Sense, Proprioception, and Posture for 14 minutes. The following activities were included:     Supine Unilateral Serratus Punches 2x10 reps B (R only) + #5 KB upside down  Supine Shoulder Horizontal Abduction x10 reps B +YTB  Supine Shoulder Circles x30 reps each (clockwise and counter clockwise) + red med ball (R only)  Standing Wall Clocks x10 reps B + YTB  Standing Shoulder ER 2x10 reps + RTB - towel under arm    Therapeutic Activities to improve functional performance for 8 minutes, including:    Standing Shoulder Rows 3x10 reps B + BTB  Standing Shoulder Extensions 3x10 reps B + BTB      manual therapy techniques:  were applied to the:  for  minutes, including:    Patient Education and Home Exercises       Education provided:   - Home Exercise Program Review  - Post Exercise Soreness  - Maintaining a pain free range of motion with all activities  - Anatomy/Physiology of the Shoulder and the surrounding musculature    Written Home Exercises Provided: Patient instructed to cont prior HEP. Exercises were reviewed and Zahida was able to demonstrate them prior to the end of the session.  Zahida demonstrated good  understanding of the education provided. See Electronic Medical Record under Patient Instructions for exercises provided during therapy sessions    Assessment     Emphasis continues to be placed on RUE stability, strength, and range of motion. Able to progress reps and or intensity of some exercises without complication. Verbal cues required for correct performance of shoulder ER - will continue to progress this exercises as able. Home Exercise Program continues to be  progressed and re-administered accordingly.    Zahida Is progressing well towards her goals.   Patient prognosis is Good.     Patient will continue to benefit from skilled outpatient physical therapy to address the deficits listed in the problem list box on initial evaluation, provide pt/family education and to maximize pt's level of independence in the home and community environment.     Patient's spiritual, cultural and educational needs considered and pt agreeable to plan of care and goals.     Anticipated barriers to physical therapy: none stated    Goals:   Short Term Goals: 4 weeks   - Patient will demonstrate improved right shoulder range of motion, especially into right shoulder abduction, by at least 15 degrees for increased functional mobility overall. (Progressing, not met)  - Patient will demonstrate improved ability to reach overhead without resistance for increased tolerance of ADL's. (Progressing, not met)  - Patient will demonstrate improved UE strength, especially into right shoulder flexion by at least 1/2 grade via MMT for increased stability and support with functional tasks. (Progressing, not met)     Long Term Goals: 8 weeks   - Patient will demonstrate improved right shoulder range of motion, especially into right shoulder flexion, by at least 15 degrees for increased functional mobility overall. (Progressing, not met)  - Patient will demonstrate improved ability to reach overhead with minimal resistance of at least #3 for increased tolerance of ADL's. (Progressing, not met)  - Patient will demonstrate improved UE strength, especially into right shoulder abduction/scaption by at least 1/2 grade via MMT for increased stability and support with functional tasks. (Progressing, not met)  - Patient will demonstrate an improved FOTO score that is greater than or equal to the predicted value for increased ability to perform ADL's. (Progressing, not met)  - Patient will demonstrate independence with  Home Exercise Program for continued improvements outside the clinical setting. (Progressing, not met)    Plan     Continue with established POC for improved functional mobility overall.    Leigha Mccauley, PT, DPT, Cert. DN

## 2023-10-16 ENCOUNTER — CLINICAL SUPPORT (OUTPATIENT)
Dept: REHABILITATION | Facility: HOSPITAL | Age: 63
End: 2023-10-16
Payer: COMMERCIAL

## 2023-10-16 DIAGNOSIS — M25.611 DECREASED ROM OF RIGHT SHOULDER: Primary | ICD-10-CM

## 2023-10-16 DIAGNOSIS — R29.898 DECREASED STRENGTH OF UPPER EXTREMITY: ICD-10-CM

## 2023-10-16 DIAGNOSIS — R68.89 DECREASED FUNCTIONAL ACTIVITY TOLERANCE: ICD-10-CM

## 2023-10-16 PROCEDURE — 97112 NEUROMUSCULAR REEDUCATION: CPT | Mod: PN

## 2023-10-16 PROCEDURE — 97530 THERAPEUTIC ACTIVITIES: CPT | Mod: PN

## 2023-10-16 PROCEDURE — 97110 THERAPEUTIC EXERCISES: CPT | Mod: PN

## 2023-10-16 NOTE — PROGRESS NOTES
[facility]   Physical Therapy Treatment Note      Name: Zahida Saleh [facility]  Clinic Number: [id number]    Therapy Diagnosis:   Encounter Diagnoses   Name Primary?    Decreased ROM of right shoulder Yes    Decreased strength of upper extremity     Decreased functional activity tolerance        Physician: Sage Rodney MD    Visit Date: 10/18/2023    Physician Orders: PT Eval and Treat   Medical Diagnosis from Referral: M25.511 (ICD-10-CM) - Right shoulder pain, unspecified chronicity  M75.01 (ICD-10-CM) - Adhesive capsulitis of right shoulder  M75.31 (ICD-10-CM) - Calcific tendinitis of right shoulder  Evaluation Date: 9/29/2023  Authorization Period Expiration: 12/31/23  Plan of Care Expiration: 11/29/23  Progress Note Due: 10/29/23  Date of Surgery: NA  Visit # / Visits authorized: 6 / 20 (1 / 1 Eval)  FOTO: 2 / 3     Precautions: Class 1 Obesity, Anxiety      Time In: 7:45 AM  Time Out: 8:30 AM  Total Billable Time: 45 minutes    PTA Visit #: 0/5     Subjective     Patient reports: reaching and lifting has been better for her as well. She was able to stop taking Tylenol as the pain is improving. Still gets a little discomfort when moving her arm in a certain way.    She was compliant with home exercise program.  Response to previous treatment: mild soreness that improved over time  Functional change: in progress    Pain: 2/10  Location: right shoulder      Objective      Objective Measures updated at progress report unless specified.     Treatment     Zahida received the treatments listed below:      Therapeutic Exercises to develop strength, endurance, ROM, and flexibility for 23 minutes including:     UBE x2 min (forward/backward)  UBE Single Arm x1 min each forward/backward (R only)  Seated Pulley's x2 min (flexion + scaption)  Standing Doorway Pec Stretch 10x10s B  Standing Wall Slides 2x10 reps B  Standing Shoulder Flexion and Scaption x10 reps - no resistance  Side-Lying Open  Books x10 reps B (no resistance)  Supine Shoulder Flexion 2x10 reps (3s holds) + #2 Dowel  Supine Unilateral Shoulder Flexion 2x10 reps (RUE only) + #2 DB  Standing Shoulder Extension 3x10 reps + #3 dowel  Side-Lying Scap Retractions 2x10 reps (R only) (3s holds)  Side-Lying Shoulder Abduction 2x10 reps (R only) + #2 DB      Neuromuscular Re-education activities to improve: Balance, Coordination, Kinesthetic, Sense, Proprioception, and Posture for 14 minutes. The following activities were included:     Supine Unilateral Serratus Punches 3x10 reps B (R only) + #5 KB upside down  Supine Shoulder Horizontal Abduction 2x10 reps B +YTB  Supine Shoulder ABC's x2 trials + red med ball (R only)  Standing Wall Clocks x10 reps B + YTB  Standing Shoulder ER 2x10 reps (#3 CC Machine)    Therapeutic Activities to improve functional performance for 8 minutes, including:    Standing Shoulder Rows 3x10 reps B + #10 CC Machine  Standing Shoulder Extensions 3x10 reps B + #13 CC Machine      manual therapy techniques:  were applied to the:  for  minutes, including:    Patient Education and Home Exercises       Education provided:   - Home Exercise Program Review  - Post Exercise Soreness  - Maintaining a pain free range of motion with all activities  - Anatomy/Physiology of the Shoulder and the surrounding musculature    Written Home Exercises Provided: Patient instructed to cont prior HEP. Exercises were reviewed and Zahida was able to demonstrate them prior to the end of the session.  Zahida demonstrated good  understanding of the education provided. See Electronic Medical Record under Patient Instructions for exercises provided during therapy sessions    Assessment     Able to progress resistance with shoulder rows and extensions with no adverse reactions. Standing shoulder flexion against gravity remains slightly limited and with discomfort present but range of motion is improving gradually. Emphasis on control and stability at the  glenohumeral joint, especially with isolated movements like serratus punches. Educated the patient on the importance of continuing to perform exercises at home.    Zahida Is progressing well towards her goals.   Patient prognosis is Good.     Patient will continue to benefit from skilled outpatient physical therapy to address the deficits listed in the problem list box on initial evaluation, provide pt/family education and to maximize pt's level of independence in the home and community environment.     Patient's spiritual, cultural and educational needs considered and pt agreeable to plan of care and goals.     Anticipated barriers to physical therapy: none stated    Goals:   Short Term Goals: 4 weeks   - Patient will demonstrate improved right shoulder range of motion, especially into right shoulder abduction, by at least 15 degrees for increased functional mobility overall. (Progressing, not met)  - Patient will demonstrate improved ability to reach overhead without resistance for increased tolerance of ADL's. (Progressing, not met)  - Patient will demonstrate improved UE strength, especially into right shoulder flexion by at least 1/2 grade via MMT for increased stability and support with functional tasks. (Progressing, not met)     Long Term Goals: 8 weeks   - Patient will demonstrate improved right shoulder range of motion, especially into right shoulder flexion, by at least 15 degrees for increased functional mobility overall. (Progressing, not met)  - Patient will demonstrate improved ability to reach overhead with minimal resistance of at least #3 for increased tolerance of ADL's. (Progressing, not met)  - Patient will demonstrate improved UE strength, especially into right shoulder abduction/scaption by at least 1/2 grade via MMT for increased stability and support with functional tasks. (Progressing, not met)  - Patient will demonstrate an improved FOTO score that is greater than or equal to the predicted  value for increased ability to perform ADL's. (Progressing, not met)  - Patient will demonstrate independence with Home Exercise Program for continued improvements outside the clinical setting. (Progressing, not met)    Plan     Continue with established POC for improved functional mobility overall.    Leigha Mccauley, PT, DPT, Cert. DN

## 2023-10-18 ENCOUNTER — CLINICAL SUPPORT (OUTPATIENT)
Dept: REHABILITATION | Facility: HOSPITAL | Age: 63
End: 2023-10-18
Payer: COMMERCIAL

## 2023-10-18 DIAGNOSIS — M25.611 DECREASED ROM OF RIGHT SHOULDER: Primary | ICD-10-CM

## 2023-10-18 DIAGNOSIS — R68.89 DECREASED FUNCTIONAL ACTIVITY TOLERANCE: ICD-10-CM

## 2023-10-18 DIAGNOSIS — R29.898 DECREASED STRENGTH OF UPPER EXTREMITY: ICD-10-CM

## 2023-10-18 PROCEDURE — 97112 NEUROMUSCULAR REEDUCATION: CPT | Mod: PN

## 2023-10-18 PROCEDURE — 97530 THERAPEUTIC ACTIVITIES: CPT | Mod: PN

## 2023-10-18 PROCEDURE — 97110 THERAPEUTIC EXERCISES: CPT | Mod: PN

## 2023-10-19 NOTE — PROGRESS NOTES
[facility]   Physical Therapy Treatment Note      Name: Zahida Saleh [facility]  Clinic Number: [id number]    Therapy Diagnosis:   Encounter Diagnoses   Name Primary?    Decreased ROM of right shoulder Yes    Decreased strength of upper extremity     Decreased functional activity tolerance      Physician: Sage Rodney MD    Visit Date: 10/23/2023    Physician Orders: PT Eval and Treat   Medical Diagnosis from Referral: M25.511 (ICD-10-CM) - Right shoulder pain, unspecified chronicity  M75.01 (ICD-10-CM) - Adhesive capsulitis of right shoulder  M75.31 (ICD-10-CM) - Calcific tendinitis of right shoulder  Evaluation Date: 9/29/2023  Authorization Period Expiration: 12/31/23  Plan of Care Expiration: 11/29/23  Progress Note Due: 10/29/23  Date of Surgery: NA  Visit # / Visits authorized: 7 / 20 (1 / 1 Eval)  FOTO: 2 / 3     Precautions: Class 1 Obesity, Anxiety      Time In: 7:45 AM  Time Out: 8:30 AM  Total Billable Time: 45 minutes    PTA Visit #: 0/5     Subjective     Patient reports: that she is not having any shoulder pain this morning. Was able to do everything she needed to do over the weekend for the most part without too much difficulty.    She was compliant with home exercise program.  Response to previous treatment: mild soreness that improved over time  Functional change: in progress    Pain: 0/10  Location: right shoulder      Objective      Objective Measures updated at progress report unless specified.     Treatment     Zahida received the treatments listed below:      Therapeutic Exercises to develop strength, endurance, ROM, and flexibility for 23 minutes including:     UBE x2 min (forward/backward)  UBE Single Arm x1 min each forward/backward (R only)  Seated Pulley's x2 min (flexion + scaption)  Standing Doorway Pec Stretch 10x10s B  Standing Wall Slides 2x10 reps B  Standing Shoulder Flexion and Scaption x10 reps - no resistance  Side-Lying Open Books x10 reps B (no  resistance)  Supine Shoulder Flexion 2x10 reps (3s holds) + #2 Dowel  Supine Unilateral Shoulder Flexion 2x10 reps (RUE only) + #2 DB  Standing Shoulder Extension 3x10 reps + #3 dowel  Side-Lying Scap Retractions 2x10 reps (R only) (3s holds)  Side-Lying Shoulder Abduction 2x10 reps (R only) + #3 DB      Neuromuscular Re-education activities to improve: Balance, Coordination, Kinesthetic, Sense, Proprioception, and Posture for 14 minutes. The following activities were included:     Supine Unilateral Serratus Punches 3x10 reps B (R only) + #5 KB upside down  Supine Shoulder Horizontal Abduction 3x10 reps B +YTB  Supine Shoulder ABC's x2 trials + #3 DB (R only)  Standing Wall Clocks x10 reps B + YTB  Standing Shoulder ER 2x10 reps (#3 CC Machine)    Therapeutic Activities to improve functional performance for 8 minutes, including:    Standing Shoulder Rows 3x10 reps B + #10 CC Machine  Standing Shoulder Extensions 3x10 reps B + #13 CC Machine      manual therapy techniques:  were applied to the:  for  minutes, including:    Patient Education and Home Exercises       Education provided:   - Home Exercise Program Review  - Post Exercise Soreness  - Maintaining a pain free range of motion with all activities  - Anatomy/Physiology of the Shoulder and the surrounding musculature    Written Home Exercises Provided: Patient instructed to cont prior HEP. Exercises were reviewed and Zahida was able to demonstrate them prior to the end of the session.  Zahida demonstrated good  understanding of the education provided. See Electronic Medical Record under Patient Instructions for exercises provided during therapy sessions    Assessment     Patient continue to improve in terms of overall shoulder mobility and UE strength. Able to progress reps and or intensity of some exercises without complication. Overhead movements remain the most limited component. Will begin to incorporate more specific overhead movements as the patient is able  to tolerate more.    Zahida Is progressing well towards her goals.   Patient prognosis is Good.     Patient will continue to benefit from skilled outpatient physical therapy to address the deficits listed in the problem list box on initial evaluation, provide pt/family education and to maximize pt's level of independence in the home and community environment.     Patient's spiritual, cultural and educational needs considered and pt agreeable to plan of care and goals.     Anticipated barriers to physical therapy: none stated    Goals:   Short Term Goals: 4 weeks   - Patient will demonstrate improved right shoulder range of motion, especially into right shoulder abduction, by at least 15 degrees for increased functional mobility overall. (Progressing, not met)  - Patient will demonstrate improved ability to reach overhead without resistance for increased tolerance of ADL's. (Progressing, not met)  - Patient will demonstrate improved UE strength, especially into right shoulder flexion by at least 1/2 grade via MMT for increased stability and support with functional tasks. (Progressing, not met)     Long Term Goals: 8 weeks   - Patient will demonstrate improved right shoulder range of motion, especially into right shoulder flexion, by at least 15 degrees for increased functional mobility overall. (Progressing, not met)  - Patient will demonstrate improved ability to reach overhead with minimal resistance of at least #3 for increased tolerance of ADL's. (Progressing, not met)  - Patient will demonstrate improved UE strength, especially into right shoulder abduction/scaption by at least 1/2 grade via MMT for increased stability and support with functional tasks. (Progressing, not met)  - Patient will demonstrate an improved FOTO score that is greater than or equal to the predicted value for increased ability to perform ADL's. (Progressing, not met)  - Patient will demonstrate independence with Home Exercise Program for  continued improvements outside the clinical setting. (Progressing, not met)    Plan     Continue with established POC for improved functional mobility overall.    Leigha Mccauley, PT, DPT, Cert. DN

## 2023-10-23 ENCOUNTER — CLINICAL SUPPORT (OUTPATIENT)
Dept: REHABILITATION | Facility: HOSPITAL | Age: 63
End: 2023-10-23
Payer: COMMERCIAL

## 2023-10-23 DIAGNOSIS — M25.611 DECREASED ROM OF RIGHT SHOULDER: Primary | ICD-10-CM

## 2023-10-23 DIAGNOSIS — R29.898 DECREASED STRENGTH OF UPPER EXTREMITY: ICD-10-CM

## 2023-10-23 DIAGNOSIS — R68.89 DECREASED FUNCTIONAL ACTIVITY TOLERANCE: ICD-10-CM

## 2023-10-23 PROCEDURE — 97530 THERAPEUTIC ACTIVITIES: CPT | Mod: PN

## 2023-10-23 PROCEDURE — 97112 NEUROMUSCULAR REEDUCATION: CPT | Mod: PN

## 2023-10-23 PROCEDURE — 97110 THERAPEUTIC EXERCISES: CPT | Mod: PN

## 2023-10-24 NOTE — PROGRESS NOTES
[facility]   Physical Therapy Treatment Note      Name: Zahida Saleh [facility]  Clinic Number: [id number]    Therapy Diagnosis:   Encounter Diagnoses   Name Primary?    Decreased ROM of right shoulder Yes    Decreased strength of upper extremity     Decreased functional activity tolerance      Physician: Sage Rodney MD    Visit Date: 10/25/2023    Physician Orders: PT Eval and Treat   Medical Diagnosis from Referral: M25.511 (ICD-10-CM) - Right shoulder pain, unspecified chronicity  M75.01 (ICD-10-CM) - Adhesive capsulitis of right shoulder  M75.31 (ICD-10-CM) - Calcific tendinitis of right shoulder  Evaluation Date: 9/29/2023  Authorization Period Expiration: 12/31/23  Plan of Care Expiration: 11/29/23  Progress Note Due: 10/29/23  Date of Surgery: NA  Visit # / Visits authorized: 8 / 20 (1 / 1 Eval)  FOTO: 2 / 3     Precautions: Class 1 Obesity, Anxiety      Time In: 7:45 AM  Time Out: 8:30 AM  Total Billable Time: 45 minutes    PTA Visit #: 0/5     Subjective     Patient reports: that she is continuing to feel better. Not really having any pain this morning. No problems after last session.    She was compliant with home exercise program.  Response to previous treatment: mild soreness that improved over time  Functional change: in progress    Pain: 0/10  Location: right shoulder      Objective      Objective Measures updated at progress report unless specified.     Treatment     Zahida received the treatments listed below:      Therapeutic Exercises to develop strength, endurance, ROM, and flexibility for 14 minutes including:     UBE x2 min (forward/backward)  UBE Single Arm x1 min each forward/backward (R only)  Seated Pulley's x2 min (flexion + scaption)  Standing Doorway Pec Stretch 10x10s B  Standing Wall Slides 2x10 reps B  Standing Shoulder Flexion and Scaption x10 reps - no resistance  Supine Shoulder Flexion 2x10 reps (3s holds) + #2 Dowel  Supine Unilateral Shoulder  Flexion 2x10 reps (RUE only) + #2 DB  Standing Shoulder Extension 3x10 reps + #3 dowel    Neuromuscular Re-education activities to improve: Balance, Coordination, Kinesthetic, Sense, Proprioception, and Posture for 23 minutes. The following activities were included:     Supine Unilateral Serratus Punches 3x10 reps B (R only) + #5 KB upside down  Standing Shoulder Horizontal Abduction x10 reps B +YTB  Supine Shoulder ABC's x2 trials + #4 DB (R only)  Standing Wall Clocks x10 reps B + YTB  Standing Shoulder ER 2x10 reps (#3 CC Machine)  Standing Ball on Wall 3x10 reps (up/down, side/side, circles, reverse circles)  Side-Lying Shoulder Abduction 2x10 reps (R only) + #4 DB  Side-Lying Shoulder Flexion 2x10 reps + #2 DB    Therapeutic Activities to improve functional performance for 8 minutes, including:    Standing Shoulder Rows 3x10 reps B + #10 CC Machine  Standing Shoulder Extensions 3x10 reps B + #13 CC Machine      manual therapy techniques:  were applied to the:  for  minutes, including:    Patient Education and Home Exercises       Education provided:   - Home Exercise Program Review  - Post Exercise Soreness  - Maintaining a pain free range of motion with all activities  - Anatomy/Physiology of the Shoulder and the surrounding musculature    Written Home Exercises Provided: Patient instructed to cont prior HEP. Exercises were reviewed and Zahida was able to demonstrate them prior to the end of the session.  Zahida demonstrated good  understanding of the education provided. See Electronic Medical Record under Patient Instructions for exercises provided during therapy sessions    Assessment     Able to progress exercises this date with good tolerance. Standing shoulder horizontal abduction remains slightly limited as well as active right shoulder scaption. Encouraged patient to continue with Home Exercise Program for maintenance outside the clinical setting. Will progress patient as able next session.    Zahida Is  progressing well towards her goals.   Patient prognosis is Good.     Patient will continue to benefit from skilled outpatient physical therapy to address the deficits listed in the problem list box on initial evaluation, provide pt/family education and to maximize pt's level of independence in the home and community environment.     Patient's spiritual, cultural and educational needs considered and pt agreeable to plan of care and goals.     Anticipated barriers to physical therapy: none stated    Goals:   Short Term Goals: 4 weeks   - Patient will demonstrate improved right shoulder range of motion, especially into right shoulder abduction, by at least 15 degrees for increased functional mobility overall. (Progressing, not met)  - Patient will demonstrate improved ability to reach overhead without resistance for increased tolerance of ADL's. (Progressing, not met)  - Patient will demonstrate improved UE strength, especially into right shoulder flexion by at least 1/2 grade via MMT for increased stability and support with functional tasks. (Progressing, not met)     Long Term Goals: 8 weeks   - Patient will demonstrate improved right shoulder range of motion, especially into right shoulder flexion, by at least 15 degrees for increased functional mobility overall. (Progressing, not met)  - Patient will demonstrate improved ability to reach overhead with minimal resistance of at least #3 for increased tolerance of ADL's. (Progressing, not met)  - Patient will demonstrate improved UE strength, especially into right shoulder abduction/scaption by at least 1/2 grade via MMT for increased stability and support with functional tasks. (Progressing, not met)  - Patient will demonstrate an improved FOTO score that is greater than or equal to the predicted value for increased ability to perform ADL's. (Progressing, not met)  - Patient will demonstrate independence with Home Exercise Program for continued improvements outside  the clinical setting. (Progressing, not met)    Plan     Continue with established POC for improved functional mobility overall.    Leigha Mccauley, PT, DPT, Cert. DN

## 2023-10-25 ENCOUNTER — CLINICAL SUPPORT (OUTPATIENT)
Dept: REHABILITATION | Facility: HOSPITAL | Age: 63
End: 2023-10-25
Payer: COMMERCIAL

## 2023-10-25 DIAGNOSIS — M25.611 DECREASED ROM OF RIGHT SHOULDER: Primary | ICD-10-CM

## 2023-10-25 DIAGNOSIS — R68.89 DECREASED FUNCTIONAL ACTIVITY TOLERANCE: ICD-10-CM

## 2023-10-25 DIAGNOSIS — R29.898 DECREASED STRENGTH OF UPPER EXTREMITY: ICD-10-CM

## 2023-10-25 PROCEDURE — 97112 NEUROMUSCULAR REEDUCATION: CPT | Mod: PN

## 2023-10-25 PROCEDURE — 97110 THERAPEUTIC EXERCISES: CPT | Mod: PN

## 2023-10-25 PROCEDURE — 97530 THERAPEUTIC ACTIVITIES: CPT | Mod: PN

## 2023-11-09 ENCOUNTER — TELEPHONE (OUTPATIENT)
Dept: FAMILY MEDICINE | Facility: CLINIC | Age: 63
End: 2023-11-09
Payer: COMMERCIAL

## 2023-11-09 NOTE — TELEPHONE ENCOUNTER
----- Message from Nadeem De Leon sent at 11/9/2023  3:47 PM CST -----  Contact: pt  Type: Needs Medical Advice  Who Called:  pt  Best Call Back Number: [phone]    Additional Information: Pt is calling the office trying to see if she can get a follow up appt 11/29.Please call back and advise.

## 2023-11-09 NOTE — TELEPHONE ENCOUNTER
----- Message from Nargis Ahuja sent at 11/9/2023  4:04 PM CST -----  Contact: Self  Type:  Patient Returning Call    Who Called:  Patient  Who Left Message for Patient:  Isis  Does the patient know what this is regarding?:  yes  Best Call Back Number:  [phone]  Additional Information:  Thank You

## 2023-11-27 ENCOUNTER — OFFICE VISIT (OUTPATIENT)
Dept: ORTHOPEDICS | Facility: CLINIC | Age: 63
End: 2023-11-27
Payer: COMMERCIAL

## 2023-11-27 DIAGNOSIS — M19.011 PRIMARY OSTEOARTHRITIS OF RIGHT SHOULDER: ICD-10-CM

## 2023-11-27 DIAGNOSIS — M75.31 CALCIFIC TENDINITIS OF RIGHT SHOULDER: Primary | ICD-10-CM

## 2023-11-27 PROCEDURE — 1160F RVW MEDS BY RX/DR IN RCRD: CPT | Mod: CPTII,S$GLB,, | Performed by: STUDENT IN AN ORGANIZED HEALTH CARE EDUCATION/TRAINING PROGRAM

## 2023-11-27 PROCEDURE — 99999 PR PBB SHADOW E&M-EST. PATIENT-LVL II: CPT | Mod: PBBFAC,,, | Performed by: STUDENT IN AN ORGANIZED HEALTH CARE EDUCATION/TRAINING PROGRAM

## 2023-11-27 PROCEDURE — 99999 PR PBB SHADOW E&M-EST. PATIENT-LVL II: ICD-10-PCS | Mod: PBBFAC,,, | Performed by: STUDENT IN AN ORGANIZED HEALTH CARE EDUCATION/TRAINING PROGRAM

## 2023-11-27 PROCEDURE — 1160F PR REVIEW ALL MEDS BY PRESCRIBER/CLIN PHARMACIST DOCUMENTED: ICD-10-PCS | Mod: CPTII,S$GLB,, | Performed by: STUDENT IN AN ORGANIZED HEALTH CARE EDUCATION/TRAINING PROGRAM

## 2023-11-27 PROCEDURE — 3044F PR MOST RECENT HEMOGLOBIN A1C LEVEL <7.0%: ICD-10-PCS | Mod: CPTII,S$GLB,, | Performed by: STUDENT IN AN ORGANIZED HEALTH CARE EDUCATION/TRAINING PROGRAM

## 2023-11-27 PROCEDURE — 1159F PR MEDICATION LIST DOCUMENTED IN MEDICAL RECORD: ICD-10-PCS | Mod: CPTII,S$GLB,, | Performed by: STUDENT IN AN ORGANIZED HEALTH CARE EDUCATION/TRAINING PROGRAM

## 2023-11-27 PROCEDURE — 99213 OFFICE O/P EST LOW 20 MIN: CPT | Mod: S$GLB,,, | Performed by: STUDENT IN AN ORGANIZED HEALTH CARE EDUCATION/TRAINING PROGRAM

## 2023-11-27 PROCEDURE — 99213 PR OFFICE/OUTPT VISIT, EST, LEVL III, 20-29 MIN: ICD-10-PCS | Mod: S$GLB,,, | Performed by: STUDENT IN AN ORGANIZED HEALTH CARE EDUCATION/TRAINING PROGRAM

## 2023-11-27 PROCEDURE — 3044F HG A1C LEVEL LT 7.0%: CPT | Mod: CPTII,S$GLB,, | Performed by: STUDENT IN AN ORGANIZED HEALTH CARE EDUCATION/TRAINING PROGRAM

## 2023-11-27 PROCEDURE — 1159F MED LIST DOCD IN RCRD: CPT | Mod: CPTII,S$GLB,, | Performed by: STUDENT IN AN ORGANIZED HEALTH CARE EDUCATION/TRAINING PROGRAM

## 2023-11-27 NOTE — PROGRESS NOTES
Patient ID: Zahida Dodd  YOB: 1960  MRN: [id number]    Chief Complaint: right shoulder fu      History of Present Illness: Zahida Dodd is a right-hand dominant 63 y.o. female who presents today for right shoulder fu. Patient states that she has been going to PT and it is working well, her ROM has gotten a lot better. She is happy with results of Glenohumeral injection and Physical Therapy. No pain today.     The patient is active in none.        Past Medical History:   Past Medical History:   Diagnosis Date    Arthritis     Diverticulitis 02/09/2021    Hyperlipidemia      Past Surgical History:   Procedure Laterality Date    COLONOSCOPY  2014    repeat in 10    COLONOSCOPY N/A 5/10/2021    Procedure: COLONOSCOPY;  Surgeon: Cale Funk MD;  Location: [facility];  Service: Endoscopy;  Laterality: N/A;    HYSTERECTOMY  2004    cervix present, on HRT for 7 years age 44-49    OOPHORECTOMY      REDUCTION OF BOTH BREASTS      TOTAL REDUCTION MAMMOPLASTY       Family History   Problem Relation Age of Onset    Heart disease Mother     Dementia Mother     Breast cancer Neg Hx     Ovarian cancer Neg Hx     Glaucoma Neg Hx     Colon cancer Neg Hx      Social History     Socioeconomic History    Marital status:    Tobacco Use    Smoking status: Never    Smokeless tobacco: Never   Substance and Sexual Activity    Alcohol use: Yes     Comment: rare    Drug use: Never    Sexual activity: Not Currently     Birth control/protection: See Surgical Hx     Social Determinants of Health     Stress: Stress Concern Present (6/5/2019)    [employer] - Occupational Stress Questionnaire     Feeling of Stress : Very much     Medication List with Changes/Refills   Current Medications    ASCORBIC ACID, VITAMIN C, (VITAMIN C) 1000 MG TABLET    Take 1,000 mg by mouth once daily.    FLUTICASONE PROPIONATE (FLONASE) 50 MCG/ACTUATION NASAL SPRAY    fluticasone  propionate 50 mcg/actuation nasal spray,suspension    AEVGNACIF-U0-ZHM11-ALGAL OIL (METANX/FOLTANX RF) 3 MG-35 MG-2 MG -90.314 MG CAP    Take 1 capsule by mouth 2 (two) times a day.    LOTEPREDNOL (LOTEMAX) 0.5 % OPHTHALMIC SUSPENSION    Lotemax 0.5 % eye drops,suspension    MELATONIN 5 MG TBDL    Take 1 tablet by mouth every evening.    OMEGA-3 FATTY ACIDS/FISH OIL (FISH OIL-OMEGA-3 FATTY ACIDS) 300-1,000 MG CAPSULE    Take by mouth once daily.    RESTASIS 0.05 % OPHTHALMIC EMULSION        VITAMIN D (VITAMIN D3) 1000 UNITS TAB    Take 2,000 Units by mouth once daily.     Review of patient's allergies indicates:   Allergen Reactions    Penicillins Hives       Physical Exam:   There is no height or weight on file to calculate BMI.    GENERAL: Well appearing, in no acute distress.  HEAD: Normocephalic and atraumatic.  ENT: External ears and nose grossly normal.  EYES: EOMI bilaterally  PULMONARY: Respirations are grossly even and non-labored.  NEURO: Awake, alert, and oriented x 3.  SKIN: No obvious rashes appreciated.  PSYCH: Mood & affect are appropriate.    Detailed MSK exam:     Right shoulder exam:   -ROM: abduction 130, forward flexion 130, external rotation 80, internal rotation 60  -empty can test pain but no weakness, resisted ER negative, belly press negative  -sutton test negative, neers test negative, whipple test positive  -biceps load test negative, yerguson test negative, Dickens's test negative  -sensation intact, pulses 2+  -TTP: none    Left shoulder exam:   -ROM: abduction 130, forward flexion 130, external rotation 90, internal rotation 70  -empty can test negative, resisted ER negative, belly press negative  -sutton test negative, neers test negative, whipple test negative  -biceps load test negative, yerguson test negative, Dickens's test negative  -sensation intact, pulses 2+  -TTP: none      Imaging:  Sports Medicine US - Guidance for Needle Placement  Sage Rodney MD     9/25/2023  12:12 PM  Sports Medicine US - Guidance for Needle Placement    Date/Time: 9/25/2023 8:40 AM    Performed by: Sage Rodney MD  Authorized by: Sage Rodney MD  Preparation: Patient was prepped and   draped in the usual sterile fashion.  Local anesthesia used: no    Anesthesia:  Local anesthesia used: no    Sedation:  Patient sedated: no    Patient tolerance: patient tolerated the procedure well with no immediate   complications  Comments: Ultrasound guidance was used for needle localization. Images   were saved and stored for documentation. The appropriate structures were   visualized. Dynamic visualization of the needle was continuous throughout   the procedures and maintained good position.   X-ray Shoulder 2 or More Views Right  Narrative: EXAMINATION:  XR SHOULDER COMPLETE 2 OR MORE VIEWS RIGHT    CLINICAL HISTORY:  Pain in right shoulder    TECHNIQUE:  Two or three views of the right shoulder were preformed.    COMPARISON:  09/05/2019    FINDINGS:  No acute fracture or dislocation.  Moderate to severe AC joint arthropathy is noted.  Large amount of amorphous calcification seen in the expected location of the rotator cuff most consistent calcium hydroxyapatite deposition within the cuff.  Moderate AC joint arthropathy is noted.  Impression: 1.  As above    Electronically signed by: Harjit Yoon DO  Date:    09/25/2023  Time:    08:57        Relevant imaging results were reviewed and interpreted by me and per my read shows moderate AC arthritic changes, calcific tendinopathy.  This was discussed with the patient and / or family today.     Assessment:  Zahida Dodd is a 63 y.o. female following up for right shoulder pain. Doing significantly better after steroid injection and PT.   Plan: continue home exercises and conservative management for pain. Consider repeat steroid injection if pain worsens again after 1 more month.   Follow up as needed. All questions answered.     Calcific  tendinitis of right shoulder    Primary osteoarthritis of right shoulder             Electronically signed:  Sage Rodney MD, MPH  11/27/2023  9:39 AM

## 2023-11-27 NOTE — PATIENT INSTRUCTIONS
Assessment:  Zahida Dodd is a 63 y.o. female No chief complaint on file.      No diagnosis found.     Plan:  Continue with home exercise program  Apply topical diclofenac (Voltaren) up to 4 times a day to the affected area.  It can be bought over the counter at any local pharmacy.    Patient may use over the counter lidocaine patches as needed for pain.  Patient may ice every 2 hours for 15 minutes as needed to control pain and swelling.   Follow up as needed    Follow-up: as needed.    Thank you for choosing [facility] and Dr. Sage Rodney for your orthopedic & sports medicine care. It is our goal to provide you with exceptional care that will help keep you healthy, active, and get you back in the game.    Please do not hesitate to reach out to us via email, phone, or MyChart with any questions, concerns, or feedback.    If you felt that you received exemplary care today, please consider leaving us feedback on [facility] at:  [email or web address]    If you are experiencing pain/discomfort ,or have questions after 5pm and would like to be connected to the [facility] on-call team, please call this number and specify which Sports Medicine provider is treating you: [phone]

## 2023-12-12 ENCOUNTER — DOCUMENTATION ONLY (OUTPATIENT)
Dept: REHABILITATION | Facility: HOSPITAL | Age: 63
End: 2023-12-12
Payer: COMMERCIAL

## 2023-12-12 PROBLEM — R29.898 DECREASED STRENGTH OF UPPER EXTREMITY: Status: RESOLVED | Noted: 2023-09-29 | Resolved: 2023-12-12

## 2023-12-12 PROBLEM — R68.89 DECREASED FUNCTIONAL ACTIVITY TOLERANCE: Status: RESOLVED | Noted: 2023-09-29 | Resolved: 2023-12-12

## 2023-12-12 PROBLEM — M25.611 DECREASED ROM OF RIGHT SHOULDER: Status: RESOLVED | Noted: 2023-09-29 | Resolved: 2023-12-12

## 2023-12-12 NOTE — PROGRESS NOTES
[facility]  Physical Therapy Discharge Note    Name: Zahida Dodd  [facility] Number: [id number]    Therapy Diagnosis:        Encounter Diagnoses   Name Primary?    Decreased ROM of right shoulder Yes    Decreased strength of upper extremity      Decreased functional activity tolerance        Physician: Sage Rodney MD     Visit Date: 10/25/2023     Physician Orders: PT Eval and Treat   Medical Diagnosis from Referral: M25.511 (ICD-10-CM) - Right shoulder pain, unspecified chronicity  M75.01 (ICD-10-CM) - Adhesive capsulitis of right shoulder  M75.31 (ICD-10-CM) - Calcific tendinitis of right shoulder  Evaluation Date: 9/29/2023  Authorization Period Expiration: 12/31/23  Plan of Care Expiration: 11/29/23  Progress Note Due: 10/29/23  Date of Surgery: NA  Visit # / Visits authorized: 8 / 20 (1 / 1 Eval)  FOTO: 2 / 3     Precautions: Class 1 Obesity, Anxiety     Date of Last visit: 9  Total Visits Received: 10/25/23    ASSESSMENT      This patient has not attended therapy since 10/25/23. This patient is now discharged from skilled outpatient therapy.    Discharge reason: Patient has not attended therapy since 10/25/23.    Discharge FOTO Score: N/A    Goals:   Short Term Goals: 4 weeks   - Patient will demonstrate improved right shoulder range of motion, especially into right shoulder abduction, by at least 15 degrees for increased functional mobility overall.(Not met - pt stopped attending therapy)  - Patient will demonstrate improved ability to reach overhead without resistance for increased tolerance of ADL's. (Not met - pt stopped attending therapy)  - Patient will demonstrate improved UE strength, especially into right shoulder flexion by at least 1/2 grade via MMT for increased stability and support with functional tasks. (Not met - pt stopped attending therapy)     Long Term Goals: 8 weeks   - Patient will demonstrate improved right shoulder range of motion, especially into  right shoulder flexion, by at least 15 degrees for increased functional mobility overall.(Not met - pt stopped attending therapy)  - Patient will demonstrate improved ability to reach overhead with minimal resistance of at least #3 for increased tolerance of ADL's. (Not met - pt stopped attending therapy)  - Patient will demonstrate improved UE strength, especially into right shoulder abduction/scaption by at least 1/2 grade via MMT for increased stability and support with functional tasks. (Not met - pt stopped attending therapy)  - Patient will demonstrate an improved FOTO score that is greater than or equal to the predicted value for increased ability to perform ADL's. (Not met - pt stopped attending therapy)  - Patient will demonstrate independence with Home Exercise Program for continued improvements outside the clinical setting. (Not met - pt stopped attending therapy)    PLAN     This patient is discharged from skilled outpatient Physical Therapy services.    Leigha Mccauley, PT, DPT, Cert. DN

## 2023-12-20 ENCOUNTER — OFFICE VISIT (OUTPATIENT)
Dept: FAMILY MEDICINE | Facility: CLINIC | Age: 63
End: 2023-12-20
Payer: COMMERCIAL

## 2023-12-20 VITALS
HEIGHT: 66 IN | HEART RATE: 63 BPM | DIASTOLIC BLOOD PRESSURE: 86 MMHG | WEIGHT: 183.44 LBS | SYSTOLIC BLOOD PRESSURE: 120 MMHG | OXYGEN SATURATION: 98 % | BODY MASS INDEX: 29.48 KG/M2

## 2023-12-20 DIAGNOSIS — K59.09 OTHER CONSTIPATION: ICD-10-CM

## 2023-12-20 DIAGNOSIS — Z23 NEED FOR VACCINATION: ICD-10-CM

## 2023-12-20 DIAGNOSIS — Z12.31 ENCOUNTER FOR SCREENING MAMMOGRAM FOR MALIGNANT NEOPLASM OF BREAST: ICD-10-CM

## 2023-12-20 DIAGNOSIS — K76.0 FATTY LIVER DISEASE, NONALCOHOLIC: Primary | ICD-10-CM

## 2023-12-20 DIAGNOSIS — E78.49 OTHER HYPERLIPIDEMIA: ICD-10-CM

## 2023-12-20 PROCEDURE — 90686 FLU VACCINE (QUAD) GREATER THAN OR EQUAL TO 3YO PRESERVATIVE FREE IM: ICD-10-PCS | Mod: S$GLB,,, | Performed by: FAMILY MEDICINE

## 2023-12-20 PROCEDURE — 3008F PR BODY MASS INDEX (BMI) DOCUMENTED: ICD-10-PCS | Mod: CPTII,S$GLB,, | Performed by: FAMILY MEDICINE

## 2023-12-20 PROCEDURE — 3074F SYST BP LT 130 MM HG: CPT | Mod: CPTII,S$GLB,, | Performed by: FAMILY MEDICINE

## 2023-12-20 PROCEDURE — 99999 PR PBB SHADOW E&M-EST. PATIENT-LVL IV: ICD-10-PCS | Mod: PBBFAC,,, | Performed by: FAMILY MEDICINE

## 2023-12-20 PROCEDURE — 3074F PR MOST RECENT SYSTOLIC BLOOD PRESSURE < 130 MM HG: ICD-10-PCS | Mod: CPTII,S$GLB,, | Performed by: FAMILY MEDICINE

## 2023-12-20 PROCEDURE — 99214 OFFICE O/P EST MOD 30 MIN: CPT | Mod: 25,S$GLB,, | Performed by: FAMILY MEDICINE

## 2023-12-20 PROCEDURE — 3079F PR MOST RECENT DIASTOLIC BLOOD PRESSURE 80-89 MM HG: ICD-10-PCS | Mod: CPTII,S$GLB,, | Performed by: FAMILY MEDICINE

## 2023-12-20 PROCEDURE — G0009 PNEUMOCOCCAL CONJUGATE VACCINE 20-VALENT: ICD-10-PCS | Mod: S$GLB,,, | Performed by: FAMILY MEDICINE

## 2023-12-20 PROCEDURE — G0009 ADMIN PNEUMOCOCCAL VACCINE: HCPCS | Mod: S$GLB,,, | Performed by: FAMILY MEDICINE

## 2023-12-20 PROCEDURE — 90677 PCV20 VACCINE IM: CPT | Mod: S$GLB,,, | Performed by: FAMILY MEDICINE

## 2023-12-20 PROCEDURE — 99214 PR OFFICE/OUTPT VISIT, EST, LEVL IV, 30-39 MIN: ICD-10-PCS | Mod: 25,S$GLB,, | Performed by: FAMILY MEDICINE

## 2023-12-20 PROCEDURE — 90686 IIV4 VACC NO PRSV 0.5 ML IM: CPT | Mod: S$GLB,,, | Performed by: FAMILY MEDICINE

## 2023-12-20 PROCEDURE — 3079F DIAST BP 80-89 MM HG: CPT | Mod: CPTII,S$GLB,, | Performed by: FAMILY MEDICINE

## 2023-12-20 PROCEDURE — 1159F MED LIST DOCD IN RCRD: CPT | Mod: CPTII,S$GLB,, | Performed by: FAMILY MEDICINE

## 2023-12-20 PROCEDURE — 3044F HG A1C LEVEL LT 7.0%: CPT | Mod: CPTII,S$GLB,, | Performed by: FAMILY MEDICINE

## 2023-12-20 PROCEDURE — G0008 ADMIN INFLUENZA VIRUS VAC: HCPCS | Mod: S$GLB,,, | Performed by: FAMILY MEDICINE

## 2023-12-20 PROCEDURE — G0008 FLU VACCINE (QUAD) GREATER THAN OR EQUAL TO 3YO PRESERVATIVE FREE IM: ICD-10-PCS | Mod: S$GLB,,, | Performed by: FAMILY MEDICINE

## 2023-12-20 PROCEDURE — 3044F PR MOST RECENT HEMOGLOBIN A1C LEVEL <7.0%: ICD-10-PCS | Mod: CPTII,S$GLB,, | Performed by: FAMILY MEDICINE

## 2023-12-20 PROCEDURE — 90677 PNEUMOCOCCAL CONJUGATE VACCINE 20-VALENT: ICD-10-PCS | Mod: S$GLB,,, | Performed by: FAMILY MEDICINE

## 2023-12-20 PROCEDURE — 99999 PR PBB SHADOW E&M-EST. PATIENT-LVL IV: CPT | Mod: PBBFAC,,, | Performed by: FAMILY MEDICINE

## 2023-12-20 PROCEDURE — 3008F BODY MASS INDEX DOCD: CPT | Mod: CPTII,S$GLB,, | Performed by: FAMILY MEDICINE

## 2023-12-20 PROCEDURE — 1159F PR MEDICATION LIST DOCUMENTED IN MEDICAL RECORD: ICD-10-PCS | Mod: CPTII,S$GLB,, | Performed by: FAMILY MEDICINE

## 2023-12-26 NOTE — PROGRESS NOTES
Assessment:       1. Fatty liver disease, nonalcoholic    2. Encounter for screening mammogram for malignant neoplasm of breast    3. Other constipation    4. Other hyperlipidemia    5. Need for vaccination        Plan:       Fatty liver disease, nonalcoholic: Stable    Encounter for screening mammogram for malignant neoplasm of breast  -     Mammo Digital Screening Bilat w/ Kole; Future; Expected date: 12/20/2023    Other constipation: Worsening    Other hyperlipidemia:  Uncontrolled    Need for vaccination  -     Influenza - Quadrivalent (PF)    Other orders  -     (In Office Administered) Pneumococcal Conjugate Vaccine (20 Valent) (IM) (Preferred)         Recommend healthy habits, avoid any ultra processed foods, eat more vegetables, more salads, drink more water.  Increase physical activity, take probiotics, increase fiber in the diet, take magnesium citrate 400 mg at bedtime.  If the symptoms get worse, the patient notify us immediately.  The patient's BMI has been recorded in the chart. The patient has been provided educational materials regarding the benefits of attaining and maintaining a normal weight. We will continue to address and follow this issue during follow up visits.   Patient agreed with assessment and plan. Patient verbalized understanding.     Subjective:       Patient ID: Zahida Dodd is a 63 y.o. female.    Chief Complaint: Follow-up (Pt is fasting and requesting blood work )    HPI    The patient is coming here today for a follow-up visit.  The patient is complaining of worsening symptoms of constipation, the patient stated that can be not going to the bathroom to have bowel movements 4 days.  She also has hyperlipidemia, the last cholesterol levels were elevated, the patient currently is not taking any cholesterol medication said for over-the-counter Omega 3 capsules.  She also has fatty liver disease, she has been trying to lose weight and eat healthy.    Past medical history,  past social history was reviewed and discussed with the patient.    Review of Systems   Constitutional:  Negative for activity change and appetite change.   HENT:  Negative for congestion and ear discharge.    Eyes:  Negative for discharge and itching.   Respiratory:  Negative for choking and chest tightness.    Cardiovascular:  Negative for chest pain and palpitations.   Gastrointestinal:  Positive for constipation. Negative for abdominal distention and abdominal pain.   Endocrine: Negative for cold intolerance and heat intolerance.   Genitourinary:  Negative for dysuria and flank pain.   Musculoskeletal:  Negative for arthralgias and back pain.   Skin:  Negative for pallor and rash.   Allergic/Immunologic: Negative for environmental allergies and food allergies.   Neurological:  Negative for dizziness and facial asymmetry.   Hematological:  Negative for adenopathy. Does not bruise/bleed easily.   Psychiatric/Behavioral:  Negative for agitation, confusion and sleep disturbance.        Objective:      Physical Exam  Vitals and nursing note reviewed.   Constitutional:       General: She is not in acute distress.     Appearance: Normal appearance. She is well-developed. She is not diaphoretic.   HENT:      Head: Normocephalic and atraumatic.      Right Ear: External ear normal.      Left Ear: External ear normal.      Nose: Nose normal.   Eyes:      General: No scleral icterus.        Right eye: No discharge.         Left eye: No discharge.      Conjunctiva/sclera: Conjunctivae normal.   Cardiovascular:      Rate and Rhythm: Normal rate and regular rhythm.      Heart sounds: Normal heart sounds.   Pulmonary:      Effort: Pulmonary effort is normal. No respiratory distress.      Breath sounds: Normal breath sounds. No wheezing.   Musculoskeletal:         General: No tenderness or deformity.      Cervical back: Neck supple.   Skin:     Coloration: Skin is not pale.      Findings: No erythema.   Neurological:      Mental  Status: She is alert.      Cranial Nerves: No cranial nerve deficit.      Coordination: Coordination normal.   Psychiatric:         Behavior: Behavior normal.         Thought Content: Thought content normal.         Judgment: Judgment normal.

## 2024-02-20 ENCOUNTER — TELEPHONE (OUTPATIENT)
Dept: FAMILY MEDICINE | Facility: CLINIC | Age: 64
End: 2024-02-20
Payer: COMMERCIAL

## 2024-02-20 NOTE — TELEPHONE ENCOUNTER
----- Message from Nadeem De Leon sent at 2/20/2024  3:33 PM CST -----  Contact: pt  Type: Needs Medical Advice  Who Called:  pt  Best Call Back Number: [phone]    Additional Information: Pt is calling the office needs to reschedule appt.Please call back and advise.

## 2024-02-21 ENCOUNTER — TELEPHONE (OUTPATIENT)
Dept: FAMILY MEDICINE | Facility: CLINIC | Age: 64
End: 2024-02-21
Payer: COMMERCIAL

## 2024-02-21 NOTE — TELEPHONE ENCOUNTER
----- Message from Natan Scott Patient Care Assistant sent at 2/21/2024  7:56 AM CST -----  Contact: Pt  Type: Return Call    Who Called: Pt  Who Left Message for Pt: Debora   Does the patient know what this is regarding: Yes  Best Call Back Number: [phone]  Thank you~

## 2024-03-06 ENCOUNTER — TELEPHONE (OUTPATIENT)
Dept: FAMILY MEDICINE | Facility: CLINIC | Age: 64
End: 2024-03-06
Payer: COMMERCIAL

## 2024-03-06 NOTE — TELEPHONE ENCOUNTER
----- Message from Grace Jhaveri Patient Care Assistant sent at 3/6/2024  1:13 PM CST -----  Regarding: appointment  Contact: pt  Type:  Sooner Appointment Request    Caller is requesting a sooner appointment.  Caller declined first available appointment listed below.  Caller will not accept being placed on the waitlist and is requesting a message be sent to doctor.    Name of Caller:  pt    When is the first available appointment?   6/2024    Symptoms:  chest pain     Would the patient rather a call back or a response via [facility]? Callback     Best Call Back Number:  [phone] (home)     Additional Information:  please call to advise. Thanks!

## 2024-03-07 ENCOUNTER — TELEPHONE (OUTPATIENT)
Dept: FAMILY MEDICINE | Facility: CLINIC | Age: 64
End: 2024-03-07
Payer: COMMERCIAL

## 2024-03-07 NOTE — TELEPHONE ENCOUNTER
----- Message from Rosalie Mota sent at 3/7/2024  7:32 AM CST -----  Contact: Self  Type:  RX Refill Request    Who Called:  Self  Refill or New Rx:  refill  RX Name and Strength:  excitalopram  Preferred Pharmacy with phone number:    [facility] [address] [facility] [place] [facility] [address] [address]  [address]  [facility] [address]  Phone: [phone] Fax: [phone]  Local or Mail Order:  Local  Ordering Provider:  Jessica Wilhelm Call Back Number:  [phone]  Additional Information:  Please call the patient back at the phone number listed above to advise. Thank you!

## 2024-03-07 NOTE — TELEPHONE ENCOUNTER
Pt states she needs refill. Was given medication in past. State sit is helping her anxiety. Needs to be sent to [facility]

## 2024-03-07 NOTE — TELEPHONE ENCOUNTER
----- Message from Lydia Spivey sent at 3/7/2024 10:37 AM CST -----  Regarding: Call back  Type:  Patient Returning Call    Who Called:Pt    Who Left Message for Patient:Isis    Does the patient know what this is regarding?:n/a    Would the patient rather a call back or a response via [facility]? Call back    Best Call Back Number:[phone]    Additional Information: Pt missed a call and would like a call back. Thank you

## 2024-03-08 RX ORDER — ESCITALOPRAM OXALATE 5 MG/1
5 TABLET ORAL NIGHTLY
Qty: 90 TABLET | Refills: 0 | Status: SHIPPED | OUTPATIENT
Start: 2024-03-08 | End: 2024-06-06

## 2024-03-08 NOTE — TELEPHONE ENCOUNTER
Spoke with patient and relayed that rx was sent to pharmacy and to take at bedtime. Pt expresses understanding and compliance

## 2024-03-11 ENCOUNTER — HOSPITAL ENCOUNTER (OUTPATIENT)
Dept: RADIOLOGY | Facility: HOSPITAL | Age: 64
Discharge: HOME | End: 2024-03-11
Attending: FAMILY MEDICINE
Payer: COMMERCIAL

## 2024-03-11 DIAGNOSIS — Z12.31 ENCOUNTER FOR SCREENING MAMMOGRAM FOR MALIGNANT NEOPLASM OF BREAST: ICD-10-CM

## 2024-03-11 PROCEDURE — 77067 SCR MAMMO BI INCL CAD: CPT | Mod: TC,PO

## 2024-03-11 PROCEDURE — 77063 BREAST TOMOSYNTHESIS BI: CPT | Mod: 26,,, | Performed by: RADIOLOGY

## 2024-03-11 PROCEDURE — 77067 SCR MAMMO BI INCL CAD: CPT | Mod: 26,,, | Performed by: RADIOLOGY

## 2024-03-26 ENCOUNTER — OFFICE VISIT (OUTPATIENT)
Dept: FAMILY MEDICINE | Facility: CLINIC | Age: 64
End: 2024-03-26
Payer: COMMERCIAL

## 2024-03-26 ENCOUNTER — TELEPHONE (OUTPATIENT)
Dept: FAMILY MEDICINE | Facility: CLINIC | Age: 64
End: 2024-03-26

## 2024-03-26 VITALS
BODY MASS INDEX: 29.9 KG/M2 | HEART RATE: 75 BPM | SYSTOLIC BLOOD PRESSURE: 130 MMHG | WEIGHT: 186.06 LBS | OXYGEN SATURATION: 95 % | RESPIRATION RATE: 18 BRPM | DIASTOLIC BLOOD PRESSURE: 80 MMHG | HEIGHT: 66 IN

## 2024-03-26 DIAGNOSIS — I10 ESSENTIAL HYPERTENSION: Primary | ICD-10-CM

## 2024-03-26 DIAGNOSIS — M35.01 KERATITIS SICCA, BILATERAL: ICD-10-CM

## 2024-03-26 DIAGNOSIS — G47.09 OTHER INSOMNIA: ICD-10-CM

## 2024-03-26 DIAGNOSIS — K76.0 FATTY INFILTRATION OF LIVER: ICD-10-CM

## 2024-03-26 DIAGNOSIS — H93.13 TINNITUS OF BOTH EARS: ICD-10-CM

## 2024-03-26 PROCEDURE — 1159F MED LIST DOCD IN RCRD: CPT | Mod: CPTII,S$GLB,, | Performed by: FAMILY MEDICINE

## 2024-03-26 PROCEDURE — 99999 PR PBB SHADOW E&M-EST. PATIENT-LVL IV: CPT | Mod: PBBFAC,,, | Performed by: FAMILY MEDICINE

## 2024-03-26 PROCEDURE — 3075F SYST BP GE 130 - 139MM HG: CPT | Mod: CPTII,S$GLB,, | Performed by: FAMILY MEDICINE

## 2024-03-26 PROCEDURE — 3079F DIAST BP 80-89 MM HG: CPT | Mod: CPTII,S$GLB,, | Performed by: FAMILY MEDICINE

## 2024-03-26 PROCEDURE — 3008F BODY MASS INDEX DOCD: CPT | Mod: CPTII,S$GLB,, | Performed by: FAMILY MEDICINE

## 2024-03-26 PROCEDURE — 99214 OFFICE O/P EST MOD 30 MIN: CPT | Mod: S$GLB,,, | Performed by: FAMILY MEDICINE

## 2024-03-26 RX ORDER — OLMESARTAN MEDOXOMIL 5 MG/1
5 TABLET ORAL DAILY
Qty: 90 TABLET | Refills: 3 | Status: SHIPPED | OUTPATIENT
Start: 2024-03-26 | End: 2025-03-26

## 2024-03-26 RX ORDER — OLMESARTAN MEDOXOMIL 5 MG/1
5 TABLET ORAL DAILY
Qty: 90 TABLET | Refills: 3 | Status: SHIPPED | OUTPATIENT
Start: 2024-03-26 | End: 2024-03-26

## 2024-03-26 RX ORDER — IBUPROFEN 100 MG/5ML
1000 SUSPENSION, ORAL (FINAL DOSE FORM) ORAL DAILY
Start: 2024-03-26

## 2024-03-26 NOTE — TELEPHONE ENCOUNTER
----- Message from Casie Huynh sent at 3/26/2024  2:52 PM CDT -----  Type: Needs Medical Advice  Who Called:  pt  Best Call Back Number: [phone]    Additional Information: pt is calling in regards to the Rx that was sent in for her this morning. Pt says that it was sent to the mail order pharmacy and then to [facility] pharmacy. Pt says that the mail order pharmacy already billed her insurance for the Rx and now she can't get it through [facility] because the insurance will not pay for it. Pt says that [place] and will already be gone before the delivery comes in and will be gone for two weeks and needs this Rx before she leaves. Pt needs to speak to someone in the office ASAP about this situation as she does not know what to do. Please call back and advise. Thanks!

## 2024-03-26 NOTE — PROGRESS NOTES
Assessment:       1. Essential hypertension    2. Keratitis sicca, bilateral    3. Other insomnia    4. Tinnitus of both ears    5. Fatty infiltration of liver    6. BMI 30.0-30.9,adult        Plan:         Essential hypertension:  Uncontrolled  -     Cancel: Comprehensive Metabolic Panel; Future  -     Comprehensive Metabolic Panel; Future  -     olmesartan (BENICAR) 5 MG Tab; Take 1 tablet (5 mg total) by mouth once daily.    Keratitis sicca, bilateral: Stable    Other insomnia: Worsening    Tinnitus of both ears:  New problem workup needed  -     Ambulatory referral/consult to Audiology; Future    Fatty infiltration of liver:  Stable  -     US Abdomen Limited_Liver; Future    BMI 30.0-30.9,adult: Stable  -     TSH; Future           Recommend to continue healthy habits, continue exercising, drink plenty water, will start Benicar 5 mg daily, will repeat blood work and ultrasound prior to her office visit in 3 months.  The patient's BMI has been recorded in the chart. The patient has been provided educational materials regarding the benefits of attaining and maintaining a normal weight. We will continue to address and follow this issue during follow up visits.   Patient agreed with assessment and plan. Patient verbalized understanding.     Subjective:       Patient ID: Zahida Dodd is a 64 y.o. female.    Chief Complaint: Follow-up hypertension    HPI    The patient is coming here today for a follow-up visit, went to the ER secondary to symptoms of chest pain and hypertension, the patient had blood work which did not show any abnormalities and also EKG was was normal.  The patient denies any chest pain at this office visit, she has been checking her blood pressure home and is been elevated sometimes in the 90s on the diastolic blood pressure.  She has currently not taking any medications for blood pressure.  She has dry eyes and she using eyedrops.  She also complains of ringing on bilateral ears, the  symptoms are getting worse.  The patient is coming here for assessment.  She has concerned about the thyroid function and also fatty liver disease.    Past medical history, past social history was reviewed and discussed with the patient.    Review of Systems   Constitutional:  Negative for activity change and appetite change.   HENT:  Positive for tinnitus. Negative for congestion and ear discharge.    Eyes:  Negative for discharge and itching.   Respiratory:  Negative for choking and chest tightness.    Cardiovascular:  Negative for chest pain and palpitations.   Gastrointestinal:  Negative for abdominal distention and abdominal pain.   Endocrine: Negative for cold intolerance and heat intolerance.   Genitourinary:  Negative for dysuria and flank pain.   Musculoskeletal:  Negative for arthralgias and back pain.   Skin:  Negative for pallor and rash.   Allergic/Immunologic: Negative for environmental allergies and food allergies.   Neurological:  Negative for dizziness, facial asymmetry and headaches.   Hematological:  Negative for adenopathy. Does not bruise/bleed easily.   Psychiatric/Behavioral:  Negative for agitation and confusion.        Objective:      Physical Exam  Vitals and nursing note reviewed.   Constitutional:       General: She is not in acute distress.     Appearance: Normal appearance. She is well-developed. She is obese. She is not diaphoretic.   HENT:      Head: Normocephalic and atraumatic.      Right Ear: External ear normal.      Left Ear: External ear normal.      Nose: Nose normal.   Eyes:      General: No scleral icterus.        Right eye: No discharge.         Left eye: No discharge.      Conjunctiva/sclera: Conjunctivae normal.   Cardiovascular:      Rate and Rhythm: Normal rate and regular rhythm.      Heart sounds: Normal heart sounds.   Pulmonary:      Effort: Pulmonary effort is normal. No respiratory distress.      Breath sounds: Normal breath sounds. No wheezing.   Musculoskeletal:          General: No tenderness or deformity.      Cervical back: Neck supple.   Skin:     Coloration: Skin is not pale.   Neurological:      Mental Status: She is alert.      Cranial Nerves: No cranial nerve deficit.   Psychiatric:         Behavior: Behavior normal.         Thought Content: Thought content normal.         Judgment: Judgment normal.

## 2024-03-26 NOTE — TELEPHONE ENCOUNTER
Spoke to pt. She does not use [facility]. She uses [facility]. Pt is going to call [facility] and ask for them to cancel the medication so [facility].

## 2024-03-26 NOTE — TELEPHONE ENCOUNTER
----- Message from Isis Jacobo MA sent at 3/26/2024  9:31 AM CDT -----  Contact: Patient  Please resend Vit C to the pharmacy.     ----- Message -----  From: Jeri Hyatt  Sent: 3/26/2024   9:30 AM CDT  To: Jessica Hernandez Staff    Type:  Needs Medical Advice    Who Called:   Patient    Pharmacy name and phone #:        [facility] [id number]  [place] - [address]  [address] [place] [address]  Phone: [phone] Fax: [phone]    Would the patient rather a call back or a response via [facility]?   Call back  Best Call Back Number:    [phone]    Additional Information:   States she would like to speak with someone - states her prescriptions from this morning were sent to the wrong pharmacy - states pharmacy does not have her prescriptions from this morning - please call - thank you

## 2024-03-26 NOTE — TELEPHONE ENCOUNTER
Pt spoke to patient informed her prescriptions were sent to the correct pharmacy,pt expressed understanding and compliance

## 2024-04-22 ENCOUNTER — CLINICAL SUPPORT (OUTPATIENT)
Dept: AUDIOLOGY | Facility: CLINIC | Age: 64
End: 2024-04-22
Payer: COMMERCIAL

## 2024-04-22 DIAGNOSIS — H90.3 BILATERAL HIGH FREQUENCY SENSORINEURAL HEARING LOSS: Primary | ICD-10-CM

## 2024-04-22 DIAGNOSIS — H93.13 TINNITUS OF BOTH EARS: ICD-10-CM

## 2024-04-22 PROCEDURE — 92567 TYMPANOMETRY: CPT | Mod: S$GLB,,, | Performed by: AUDIOLOGIST-HEARING AID FITTER

## 2024-04-22 PROCEDURE — 99999 PR PBB SHADOW E&M-EST. PATIENT-LVL II: CPT | Mod: PBBFAC,,, | Performed by: AUDIOLOGIST-HEARING AID FITTER

## 2024-04-22 PROCEDURE — 92557 COMPREHENSIVE HEARING TEST: CPT | Mod: S$GLB,,, | Performed by: AUDIOLOGIST-HEARING AID FITTER

## 2024-04-22 NOTE — Clinical Note
Thank you for your referral to audiology. Hearing testing completed. Results reveal a bilateral normal through 4K Hz sloping to moderate HF sensorineural hearing loss.    Speech Reception Thresholds were  0 dBHL for the right ear and 5 dBHL for the left ear.    Word recognition scores were excellent for the right ear and excellent for the left ear.   Tympanograms were Type A for the right ear and Type A for the left ear. Recommend trial of binaural amplification for the Tx of tinnitus pending medical clearance and repeat hearing testing in one year due to tinnitus. She is not interested in hearing aids at this time.

## 2024-04-22 NOTE — PROGRESS NOTES
Zahida Dodd was seen 04/22/2024 for an audiological evaluation. Pt was alone during today's visit. Pertinent complaints today include tinnitus L>R. Pt denies history of loud noise exposure and denies early onset of genetic family history of hearing loss. Otoscopy revealed minimal cerumen in the right ear. The tympanic membrane was visualized AU prior to proceeding with the hearing testing.     Results reveal a bilateral normal through 4K Hz sloping to moderate HF sensorineural hearing loss.    Speech Reception Thresholds were  0 dBHL for the right ear and 5 dBHL for the left ear.    Word recognition scores were excellent for the right ear and excellent for the left ear.   Tympanograms were Type A for the right ear and Type A for the left ear.    Audiogram results were reviewed in detail with patient and all questions were answered. Results will be reviewed by the referring provider at the completion of this note. All complaints were addressed during this visit to the patient's satisfaction. Recommend trial of binaural amplification for the Tx of tinnitus pending medical clearance, repeat hearing testing in one year due to tinnitus and bilateral hearing protection with either muffs or in-ear protection in loud noises. Plan of care was discussed in detail with the patient, who agreed with the plan as above. She is not interested in hearing aids at this time.

## 2024-06-19 ENCOUNTER — HOSPITAL ENCOUNTER (OUTPATIENT)
Dept: RADIOLOGY | Facility: HOSPITAL | Age: 64
Discharge: HOME | End: 2024-06-19
Attending: FAMILY MEDICINE
Payer: COMMERCIAL

## 2024-06-19 DIAGNOSIS — K76.0 FATTY INFILTRATION OF LIVER: ICD-10-CM

## 2024-06-19 PROCEDURE — 76705 ECHO EXAM OF ABDOMEN: CPT | Mod: 26,,, | Performed by: RADIOLOGY

## 2024-06-19 PROCEDURE — 76705 ECHO EXAM OF ABDOMEN: CPT | Mod: TC,PO

## 2024-06-26 ENCOUNTER — LAB VISIT (OUTPATIENT)
Dept: LAB | Facility: HOSPITAL | Age: 64
End: 2024-06-26
Attending: FAMILY MEDICINE
Payer: COMMERCIAL

## 2024-06-26 ENCOUNTER — OFFICE VISIT (OUTPATIENT)
Dept: FAMILY MEDICINE | Facility: CLINIC | Age: 64
End: 2024-06-26
Payer: COMMERCIAL

## 2024-06-26 VITALS
BODY MASS INDEX: 29.78 KG/M2 | WEIGHT: 185.31 LBS | SYSTOLIC BLOOD PRESSURE: 118 MMHG | HEART RATE: 72 BPM | TEMPERATURE: 98 F | HEIGHT: 66 IN | DIASTOLIC BLOOD PRESSURE: 78 MMHG | OXYGEN SATURATION: 98 %

## 2024-06-26 DIAGNOSIS — R07.89 OTHER CHEST PAIN: ICD-10-CM

## 2024-06-26 DIAGNOSIS — F43.9 STRESS: ICD-10-CM

## 2024-06-26 DIAGNOSIS — K76.0 FATTY LIVER DISEASE, NONALCOHOLIC: Primary | ICD-10-CM

## 2024-06-26 DIAGNOSIS — E78.49 OTHER HYPERLIPIDEMIA: ICD-10-CM

## 2024-06-26 LAB
CHOLEST SERPL-MCNC: 257 MG/DL (ref 120–199)
CHOLEST/HDLC SERPL: 4 {RATIO} (ref 2–5)
HDLC SERPL-MCNC: 65 MG/DL (ref 40–75)
HDLC SERPL: 25.3 % (ref 20–50)
LDLC SERPL CALC-MCNC: 162.6 MG/DL (ref 63–159)
NONHDLC SERPL-MCNC: 192 MG/DL
TRIGL SERPL-MCNC: 147 MG/DL (ref 30–150)

## 2024-06-26 PROCEDURE — 3078F DIAST BP <80 MM HG: CPT | Mod: CPTII,S$GLB,, | Performed by: FAMILY MEDICINE

## 2024-06-26 PROCEDURE — 3008F BODY MASS INDEX DOCD: CPT | Mod: CPTII,S$GLB,, | Performed by: FAMILY MEDICINE

## 2024-06-26 PROCEDURE — 1159F MED LIST DOCD IN RCRD: CPT | Mod: CPTII,S$GLB,, | Performed by: FAMILY MEDICINE

## 2024-06-26 PROCEDURE — 80061 LIPID PANEL: CPT | Performed by: FAMILY MEDICINE

## 2024-06-26 PROCEDURE — 93005 ELECTROCARDIOGRAM TRACING: CPT | Mod: S$GLB,,, | Performed by: FAMILY MEDICINE

## 2024-06-26 PROCEDURE — 36415 COLL VENOUS BLD VENIPUNCTURE: CPT | Mod: PO | Performed by: FAMILY MEDICINE

## 2024-06-26 PROCEDURE — 99999 PR PBB SHADOW E&M-EST. PATIENT-LVL IV: CPT | Mod: PBBFAC,,, | Performed by: FAMILY MEDICINE

## 2024-06-26 PROCEDURE — 93010 ELECTROCARDIOGRAM REPORT: CPT | Mod: S$GLB,,, | Performed by: INTERNAL MEDICINE

## 2024-06-26 PROCEDURE — 99214 OFFICE O/P EST MOD 30 MIN: CPT | Mod: S$GLB,,, | Performed by: FAMILY MEDICINE

## 2024-06-26 PROCEDURE — 3074F SYST BP LT 130 MM HG: CPT | Mod: CPTII,S$GLB,, | Performed by: FAMILY MEDICINE

## 2024-06-26 NOTE — PROGRESS NOTES
Assessment:       1. Fatty liver disease, nonalcoholic    2. Other hyperlipidemia    3. BMI 29.0-29.9,adult    4. Other chest pain    5. Stress        Plan:       Fatty liver disease, nonalcoholic:  New problem workup needed  -     Ambulatory referral/consult to Hepatology; Future; Expected date: 07/03/2024    Other hyperlipidemia:  Uncontrolled  -     Lipid Panel; Future; Expected date: 06/26/2024    BMI 29.0-29.9,adult:  Worsening    Other chest pain:  New problem workup needed  -     EKG 12-lead; Future  -     Lipid Panel; Future; Expected date: 06/26/2024    Stress:  Worsening         Recommend healthy habits, relaxation techniques, drink plenty water, will refer the patient to the hepatologist secondary to nonalcoholic fatty liver disease.  The patient's BMI has been recorded in the chart. The patient has been provided educational materials regarding the benefits of attaining and maintaining a normal weight. We will continue to address and follow this issue during follow up visits.   Patient agreed with assessment and plan. Patient verbalized understanding.     Subjective:       Patient ID: Zahida Dodd is a 64 y.o. female.    Chief Complaint: Follow-up and ultrasound results.    HPI    The patient is here today for a follow-up visit, the patient had ultrasound that showed presence of fatty liver disease.  The patient is very concerned about these, she is stopping all her supplements and medications and she wants to make sure that everything is okay, she has been under a lot of stress, not sleeping well at night, she is also having chest tightness, the symptoms are getting worse.  The patient has high cholesterol and she would like the levels to be checked again.  She has also been gaining weight.  She has been having also ringing of the ear, she was told that she needed hearing aids, but she prefer not have them.    Past medical history, past social history was reviewed and discussed with the  patient.    Review of Systems   HENT:  Positive for tinnitus.    Respiratory:  Positive for chest tightness. Negative for cough.    Gastrointestinal:  Negative for abdominal distention and abdominal pain.   Musculoskeletal:  Negative for arthralgias and back pain.   Psychiatric/Behavioral:  Positive for dysphoric mood and sleep disturbance. The patient is nervous/anxious.        Objective:      Physical Exam  Vitals and nursing note reviewed.   Constitutional:       General: She is not in acute distress.     Appearance: Normal appearance. She is well-developed. She is not diaphoretic.   HENT:      Head: Normocephalic and atraumatic.      Right Ear: External ear normal.      Left Ear: External ear normal.      Nose: Nose normal.   Eyes:      General: No scleral icterus.        Right eye: No discharge.         Left eye: No discharge.      Conjunctiva/sclera: Conjunctivae normal.      Pupils: Pupils are equal, round, and reactive to light.   Cardiovascular:      Rate and Rhythm: Normal rate and regular rhythm.      Heart sounds: Normal heart sounds.   Pulmonary:      Effort: Pulmonary effort is normal. No respiratory distress.      Breath sounds: Normal breath sounds. No wheezing.   Musculoskeletal:         General: No tenderness or deformity.      Cervical back: Neck supple.   Neurological:      Mental Status: She is alert.   Psychiatric:         Behavior: Behavior normal.         Thought Content: Thought content normal.         Judgment: Judgment normal.

## 2024-06-27 ENCOUNTER — TELEPHONE (OUTPATIENT)
Dept: FAMILY MEDICINE | Facility: CLINIC | Age: 64
End: 2024-06-27

## 2024-06-27 ENCOUNTER — PATIENT MESSAGE (OUTPATIENT)
Dept: FAMILY MEDICINE | Facility: CLINIC | Age: 64
End: 2024-06-27
Payer: COMMERCIAL

## 2024-06-27 LAB
OHS QRS DURATION: 92 MS
OHS QTC CALCULATION: 380 MS

## 2024-06-27 NOTE — TELEPHONE ENCOUNTER
Pt is requesting a prescription for lovaza, she doesn't believe the otc fish oil is working for her and would like to try this medication.

## 2024-06-27 NOTE — TELEPHONE ENCOUNTER
----- Message from Kristen Morales sent at 6/27/2024  8:23 AM CDT -----  Type:  Needs Medical Advice    Who Called:  Pt    Would the patient rather a call back or a response via [facility]?  Call back    Best Call Back Number:  [phone]    Additional Information:  Pt is asking to speak with someone.   Please call back to advise. Thanks!

## 2024-09-19 ENCOUNTER — OFFICE VISIT (OUTPATIENT)
Facility: CLINIC | Age: 64
End: 2024-09-19
Payer: COMMERCIAL

## 2024-09-19 VITALS — WEIGHT: 177 LBS | BODY MASS INDEX: 28.45 KG/M2 | HEIGHT: 66 IN

## 2024-09-19 DIAGNOSIS — K76.0 METABOLIC DYSFUNCTION-ASSOCIATED STEATOTIC LIVER DISEASE (MASLD): Primary | ICD-10-CM

## 2024-09-19 DIAGNOSIS — E66.3 OVERWEIGHT (BMI 25.0-29.9): ICD-10-CM

## 2024-09-19 DIAGNOSIS — E78.49 OTHER HYPERLIPIDEMIA: ICD-10-CM

## 2024-09-19 PROBLEM — E66.811 CLASS 1 OBESITY DUE TO EXCESS CALORIES WITHOUT SERIOUS COMORBIDITY WITH BODY MASS INDEX (BMI) OF 34.0 TO 34.9 IN ADULT: Status: RESOLVED | Noted: 2019-06-05 | Resolved: 2024-09-19

## 2024-09-19 PROBLEM — E66.09 CLASS 1 OBESITY DUE TO EXCESS CALORIES WITHOUT SERIOUS COMORBIDITY WITH BODY MASS INDEX (BMI) OF 34.0 TO 34.9 IN ADULT: Status: RESOLVED | Noted: 2019-06-05 | Resolved: 2024-09-19

## 2024-09-19 PROBLEM — E66.09 CLASS 1 OBESITY DUE TO EXCESS CALORIES WITHOUT SERIOUS COMORBIDITY WITH BODY MASS INDEX (BMI) OF 31.0 TO 31.9 IN ADULT: Status: RESOLVED | Noted: 2023-02-03 | Resolved: 2024-09-19

## 2024-09-19 PROBLEM — E66.811 CLASS 1 OBESITY DUE TO EXCESS CALORIES WITHOUT SERIOUS COMORBIDITY WITH BODY MASS INDEX (BMI) OF 31.0 TO 31.9 IN ADULT: Status: RESOLVED | Noted: 2023-02-03 | Resolved: 2024-09-19

## 2024-09-19 PROCEDURE — 1159F MED LIST DOCD IN RCRD: CPT | Mod: CPTII,S$GLB,, | Performed by: NURSE PRACTITIONER

## 2024-09-19 PROCEDURE — 99999 PR PBB SHADOW E&M-EST. PATIENT-LVL III: CPT | Mod: PBBFAC,,, | Performed by: NURSE PRACTITIONER

## 2024-09-19 PROCEDURE — 3008F BODY MASS INDEX DOCD: CPT | Mod: CPTII,S$GLB,, | Performed by: NURSE PRACTITIONER

## 2024-09-19 PROCEDURE — 99204 OFFICE O/P NEW MOD 45 MIN: CPT | Mod: S$GLB,,, | Performed by: NURSE PRACTITIONER

## 2024-09-19 NOTE — PATIENT INSTRUCTIONS
Fibroscan to assess for liver fibrosis/damage from fatty liver  Stop tumeric       FATTY LIVER EDUCATION:  Lifestyle changes can help to reverse fatty liver and prevent liver damage:  1. Ongoing weight loss efforts    *Weight loss of 5% has been shown to reduce fat in the liver  *Weight loss of 7-10% has been shown to improve fatty liver, inflammation from fatty liver, and liver fibrosis (scarring/damage)    2. Decreasing daily calories is recommended for weight loss. Try to limit carbohydrate intake to LESS than 30-45 grams of carbs with a meal and LESS than 5-10 grams of carbs with a snack. Avoid drinking beverages with sugar/carbohydrates. Meeting with a dietician or using one of the weight loss apps below can be helpful to determine individualized calorie goals and add up carbs throughout the day. Look for snacks high in protein, low in carbohydrates/sugar.    3. Exercise as tolerated. Studies have shown that exercise improves fatty liver even without weight loss.     4. Recommend good control of blood pressure, cholesterol, and blood sugar levels as these are risk factors for fatty liver. Cholesterol lowering medications including statins are typically safe and beneficial (even if liver enzymes are elevated).    5. Limit alcohol consumption, no more than 1 serving(s) of alcohol in any day (1 serving is 5 ounces of wine, 12 ounces of beer, or 1.5 ounces of liquor). Do not recommend daily alcohol use or drinking alcohol most days per week.    In some people, fatty liver can progress to steatohepatitis (inflammatory fatty liver). This can cause liver scarring or damage (fibrosis) and possibly cirrhosis. Cirrhosis increases risk of liver cancer and liver failure. Lifestyle changes now can help to decrease this risk.         Additional Resources:    Websites with information about fatty liver and inflammation related to fatty liver (CHAN): www.nashtruth.com and www.nashactually.com   [facility]: Non-Alcoholic Fatty  Liver Disease (NAFLD)    Facebook support group with tips and recipes:   Non-Alcoholic Fatty Liver Disease (NAFLD) Diet & Nutrition Support     Can download Procura Pal or Lose It shahrzad to add up your calories and carbohydrates throughout the day.      Try www.LgDb.com.[employer] for recipes.    If interested in seeing a dietician to create a weight loss plan, contact the dietician team at [facility]: [email or web address].  You can also call to schedule a consult with a dietician: [phone]  *Virtual visits are available with one of our dieticians.     If you have diabetes or high blood pressure, you can meet with a Health  through [facility] [employer] program.    Let us know if you are interested in a referral to [facility].

## 2024-09-19 NOTE — PROGRESS NOTES
[facility] - New Patient     REFERRING PROVIDER: Dr. Mary Lopez  PCP: Mary Lopez MD    Chief Complaint: Fatty liver      HISTORY     This is a 64 y.o. female referred for evaluation of above.    Fatty liver noted on recent US 6/19/24. Liver normal size, no liver lesions or biliary dilation. Spleen WNL. No imaging findings to suggest advanced liver fibrosis.     Review of labs:  - Transaminases: ALT mildly elevated x 1 in 2021, currently WNL  - Alk phos: WNL  - Synthetic liver function: WNL  - Platelets: WNL    Workup thus far:  Serologies:   Lab Results   Component Value Date    FERRITIN 126 03/24/2023    FESATURATED 29 03/24/2023    HEPCAB Negative 06/05/2019      Risk factors for fatty liver:   Weight -- Body mass index is 28.57 kg/m².      She has lost almost 40 lb over the last few years   Has cut sugar from her coffee; trying to decrease sugar in her diet    Walking daily, riding the bike                  Dyslipidemia -- yes, LDL >160   On omega 3                               Insulin resistance / diabetes -- no       Hypertension -- no   Alcohol use -- special occasions, holidays    Family history:  No family history of liver disease, cirrhosis, or liver cancer.    Meds/supplements:  -Rx: none concerning from liver standpoint  -OTC, herbs/vitamins: tumeric, NAC, probiotic, liver supplement (milk thistle, resveratrol, berberine, artichoke)  No recent medication changes.     Social history:  Occupation: works for pharmaceutical company  Exercise: not strenuous; no myalgias   Denies illicit drug use.    No symptoms of hepatic decompensation including jaundice, ascites, cognitive problems to suggest hepatic encephalopathy, or GI bleeding.        Past medical history, surgical history, problem list, family history, social history, allergies: Reviewed and updated in the appropriate section of the electronic medical record.      Current Outpatient Medications   Medication Sig Dispense Refill     "omega-3 fatty acids/fish oil (FISH OIL-OMEGA-3 FATTY ACIDS) 300-1,000 mg capsule Take by mouth once daily.       No current facility-administered medications for this visit.     Medication list reviewed and updated.      Review of Systems - as per HPI  Constitutional: Negative for unexpected weight change.   Respiratory: Negative for shortness of breath.    Cardiovascular: Negative for leg swelling.  Gastrointestinal: Negative for abdominal distention or abdominal pain. Negative for melena or hematemesis.  Musculoskeletal: Negative for myalgias.    Skin: Negative for jaundice or itching.  Neurological: Negative for confusion or slowed mentation. Negative for tremors.   Hematological: Does not bruise/bleed easily.       Physical Exam   Constitutional: No distress. Alert and oriented.  Eyes: No scleral icterus.   Pulmonary/Chest: Respiratory effort normal. No respiratory distress.   Abdominal: No distension, no ascites appreciated.   Extremities: No edema.   Neurological: No tremor.   Skin: No jaundice.   Psychiatric: Normal mood and affect. Speech, behavior, and thought content normal.       LABS & DIAGNOSTIC STUDIES     I have personally reviewed pertinent laboratory findings:    Lab Results   Component Value Date    ALT 23 06/19/2024    AST 20 06/19/2024    ALKPHOS 57 06/19/2024    BILITOT 0.3 06/19/2024    ALBUMIN 3.7 06/19/2024       Lab Results   Component Value Date    WBC 5.34 03/24/2023    HGB 13.8 03/24/2023    HCT 41.7 03/24/2023    MCV 91 03/24/2023     03/24/2023       Lab Results   Component Value Date     06/19/2024    K 3.8 06/19/2024    BUN 13 06/19/2024    CREATININE 0.8 06/19/2024    ESTGFRAFRICA >60.0 02/21/2022    EGFRNONAA >60.0 02/21/2022       Lab Results   Component Value Date    FERRITIN 126 03/24/2023    FESATURATED 29 03/24/2023    HEPCAB Negative 06/05/2019       No results found for: "AFP"      I have personally reviewed the following result reports:  Abdominal US - " 6/19/24  CT - 3/24/23      ASSESSMENT & PLAN     64 y.o. female with:    1. Metabolic dysfunction-associated steatotic liver disease (MASLD)    2. Other hyperlipidemia    3. Overweight (BMI 25.0-29.9)          FIB-4 Calculation: 1.3 at 9/19/2024  8:59 AM     FIB-4 below 1.30 is considered as low-risk for advanced fibrosis  FIB-4 over 2.67 is considered as high-risk for advanced fibrosis  FIB-4 values between 1.30 and 2.67 are considered as intermediate-risk of advanced fibrosis for ages 36-64.   For ages > 64 the cut-off for low-risk goes to < 2.      ALT has normalized with weight loss.     Recommendations:   Obtain Fibroscan for fibrosis and steatosis staging.  Screen for HBV, vaccinate for hep A/B if not immune   Commended on weight loss success, encouraged to continue current efforts.   Mediterranean diet - diet should be low in carbohydrates, added sugars, and processed foods. Recommend increasing protein and fiber intake.   150 min/week of moderate exercise (aerobic + resistance), as tolerated.  Maintain good control of blood pressure, cholesterol, and blood sugar  If no advanced fibrosis, should limit alcohol to max 1 standard drinks/day. Do not recommend daily alcohol use or drinking most days per week.   Stop tumeric due to risk of DILI       Orders Placed This Encounter   Procedures    FibroScan Transplant Hepatology(Vibration Controlled Transient Elastography)    Hepatitis A antibody, IgG    Hepatitis B Core Antibody, Total    Hepatitis B Surface Ab, Qualitative    Hepatitis B Surface Antigen       Return to clinic same day as Fibroscan.       Thank you for allowing me to participate in the care of HERNÁN Herrera-[facility]  Hepatology        Duration of encounter: 45 min  This includes face to face time and non-face to face time preparing to see the patient (eg, review of tests), obtaining and/or reviewing separately obtained history, documenting clinical information in the  electronic or other health record, independently interpreting results and communicating results to the patient/family/caregiver, or Care coordination.

## 2024-09-26 ENCOUNTER — OFFICE VISIT (OUTPATIENT)
Dept: FAMILY MEDICINE | Facility: CLINIC | Age: 64
End: 2024-09-26
Payer: COMMERCIAL

## 2024-09-26 ENCOUNTER — LAB VISIT (OUTPATIENT)
Dept: LAB | Facility: HOSPITAL | Age: 64
End: 2024-09-26
Attending: FAMILY MEDICINE
Payer: COMMERCIAL

## 2024-09-26 VITALS
HEART RATE: 64 BPM | RESPIRATION RATE: 18 BRPM | HEIGHT: 66 IN | SYSTOLIC BLOOD PRESSURE: 120 MMHG | BODY MASS INDEX: 28.56 KG/M2 | WEIGHT: 177.69 LBS | OXYGEN SATURATION: 95 % | DIASTOLIC BLOOD PRESSURE: 70 MMHG

## 2024-09-26 DIAGNOSIS — E78.49 OTHER HYPERLIPIDEMIA: ICD-10-CM

## 2024-09-26 DIAGNOSIS — E78.49 OTHER HYPERLIPIDEMIA: Primary | ICD-10-CM

## 2024-09-26 DIAGNOSIS — K76.0 METABOLIC DYSFUNCTION-ASSOCIATED STEATOTIC LIVER DISEASE (MASLD): ICD-10-CM

## 2024-09-26 DIAGNOSIS — Z78.0 POST-MENOPAUSAL: ICD-10-CM

## 2024-09-26 DIAGNOSIS — Z23 IMMUNIZATION DUE: ICD-10-CM

## 2024-09-26 DIAGNOSIS — K76.0 FATTY LIVER DISEASE, NONALCOHOLIC: ICD-10-CM

## 2024-09-26 LAB
CHOLEST SERPL-MCNC: 256 MG/DL (ref 120–199)
CHOLEST/HDLC SERPL: 3.9 {RATIO} (ref 2–5)
HAV IGG SER QL IA: REACTIVE
HBV CORE AB SERPL QL IA: NORMAL
HBV SURFACE AB SER-ACNC: <3 MIU/ML
HBV SURFACE AB SER-ACNC: NORMAL M[IU]/ML
HBV SURFACE AG SERPL QL IA: NORMAL
HDLC SERPL-MCNC: 66 MG/DL (ref 40–75)
HDLC SERPL: 25.8 % (ref 20–50)
LDLC SERPL CALC-MCNC: 163.4 MG/DL (ref 63–159)
NONHDLC SERPL-MCNC: 190 MG/DL
TRIGL SERPL-MCNC: 133 MG/DL (ref 30–150)

## 2024-09-26 PROCEDURE — 99999 PR PBB SHADOW E&M-EST. PATIENT-LVL IV: CPT | Mod: PBBFAC,,, | Performed by: FAMILY MEDICINE

## 2024-09-26 PROCEDURE — 36415 COLL VENOUS BLD VENIPUNCTURE: CPT | Mod: PO | Performed by: NURSE PRACTITIONER

## 2024-09-26 PROCEDURE — 1159F MED LIST DOCD IN RCRD: CPT | Mod: CPTII,S$GLB,, | Performed by: FAMILY MEDICINE

## 2024-09-26 PROCEDURE — 3078F DIAST BP <80 MM HG: CPT | Mod: CPTII,S$GLB,, | Performed by: FAMILY MEDICINE

## 2024-09-26 PROCEDURE — 86706 HEP B SURFACE ANTIBODY: CPT | Mod: 91 | Performed by: NURSE PRACTITIONER

## 2024-09-26 PROCEDURE — 3074F SYST BP LT 130 MM HG: CPT | Mod: CPTII,S$GLB,, | Performed by: FAMILY MEDICINE

## 2024-09-26 PROCEDURE — 80061 LIPID PANEL: CPT | Performed by: FAMILY MEDICINE

## 2024-09-26 PROCEDURE — 86790 VIRUS ANTIBODY NOS: CPT | Performed by: NURSE PRACTITIONER

## 2024-09-26 PROCEDURE — 3008F BODY MASS INDEX DOCD: CPT | Mod: CPTII,S$GLB,, | Performed by: FAMILY MEDICINE

## 2024-09-26 PROCEDURE — 86704 HEP B CORE ANTIBODY TOTAL: CPT | Performed by: NURSE PRACTITIONER

## 2024-09-26 PROCEDURE — 87340 HEPATITIS B SURFACE AG IA: CPT | Performed by: NURSE PRACTITIONER

## 2024-09-26 PROCEDURE — 90656 IIV3 VACC NO PRSV 0.5 ML IM: CPT | Mod: S$GLB,,, | Performed by: FAMILY MEDICINE

## 2024-09-26 PROCEDURE — 90471 IMMUNIZATION ADMIN: CPT | Mod: S$GLB,,, | Performed by: FAMILY MEDICINE

## 2024-09-26 PROCEDURE — 99214 OFFICE O/P EST MOD 30 MIN: CPT | Mod: 25,S$GLB,, | Performed by: FAMILY MEDICINE

## 2024-09-27 ENCOUNTER — TELEPHONE (OUTPATIENT)
Dept: FAMILY MEDICINE | Facility: CLINIC | Age: 64
End: 2024-09-27

## 2024-09-27 NOTE — TELEPHONE ENCOUNTER
----- Message from Adi Treviño sent at 9/27/2024 12:07 PM CDT -----  Type: Needs Medical Advice    Who Called:  Pt    Best Call Back Number: [phone]    Additional Information: Pt got a text telling her to connect with [facility] in regards to lab results and scheduling a virtual shahrzad. Unsure who to scheduled. Please call back to advise, Thanks!

## 2024-09-30 ENCOUNTER — TELEPHONE (OUTPATIENT)
Dept: HEPATOLOGY | Facility: CLINIC | Age: 64
End: 2024-09-30
Payer: COMMERCIAL

## 2024-09-30 ENCOUNTER — TELEPHONE (OUTPATIENT)
Dept: FAMILY MEDICINE | Facility: CLINIC | Age: 64
End: 2024-09-30
Payer: COMMERCIAL

## 2024-09-30 NOTE — TELEPHONE ENCOUNTER
----- Message from [place] sent at 9/30/2024 12:30 PM CDT -----  Regarding: Needs return call today  Type: Needs Medical Advice  Who Called:  Pt    Best Call Back Number: [phone]    Additional Information: Pt called and said she had been trying to speak to someone regarding her recent lab results.. she said that the results came back positive for something concerning and she is wondering why no one has called to talk about that with her or gave her any guidance on what she is supposed to do moving forward and if she needs to see another dr, please call to advise

## 2024-09-30 NOTE — TELEPHONE ENCOUNTER
Patient was called. No answer. Voice mail was left asking her to return the call.    Soila    ----- Message from Adi sent at 9/27/2024 12:07 PM CDT -----  Type: Needs Medical Advice    Who Called:  Pt    Best Call Back Number: [phone]    Additional Information: Pt got a text telling her to connect with [facility] in regards to lab results and scheduling a virtual shahrzad. Unsure who to scheduled. Please call back to advise, Thanks!

## 2024-09-30 NOTE — PROGRESS NOTES
Assessment:       1. Other hyperlipidemia    2. Immunization due    3. Post-menopausal    4. BMI 28.0-28.9,adult    5. Fatty liver disease, nonalcoholic        Plan:       Other hyperlipidemia:  Uncontrolled  -     Lipid Panel; Future; Expected date: 09/26/2024    Immunization due  -     influenza (Flulaval, Fluzone, Fluarix) 45 mcg/0.5 mL IM vaccine (> or = 6 mo) 0.5 mL    Post-menopausal  -     DXA Bone Density Axial Skeleton 1 or more sites; Future; Expected date: 09/26/2024    BMI 28.0-28.9,adult: Improved    Fatty liver disease, nonalcoholic: Stable         Recommend to continue healthy habits, avoid any ultra processed foods, increase physical activity, bone density test was ordered, lipid profile was ordered.  Will send a message when we have the results of the test.  The patient's BMI has been recorded in the chart. The patient has been provided educational materials regarding the benefits of attaining and maintaining a normal weight. We will continue to address and follow this issue during follow up visits.   Patient agreed with assessment and plan. Patient verbalized understanding.     Subjective:       Patient ID: Zahida Dodd is a 64 y.o. female.    Chief Complaint: Follow-up and hyperlipidemia    HPI    The patient is here today for a follow-up on hyperlipidemia, the last cholesterol levels showed that the LDL levels were elevated, the patient also is been trying to eat healthier and being more active and lost 8 lb since her last office visit.  The patient also has fatty liver disease and currently seen the liver specialist.  She would like to have the flu vaccine done today.  She is also due for a bone density test and she would like this to be ordered.    Past medical history, past social history was reviewed and discussed with the patient.    Review of Systems    Objective:      Physical Exam  Vitals and nursing note reviewed.   Constitutional:       General: She is not in acute distress.      Appearance: Normal appearance. She is well-developed. She is not diaphoretic.   HENT:      Head: Normocephalic and atraumatic.      Right Ear: External ear normal.      Left Ear: External ear normal.      Nose: Nose normal.   Eyes:      General: No scleral icterus.        Right eye: No discharge.         Left eye: No discharge.      Conjunctiva/sclera: Conjunctivae normal.   Cardiovascular:      Rate and Rhythm: Normal rate and regular rhythm.      Heart sounds: Normal heart sounds.   Pulmonary:      Effort: Pulmonary effort is normal. No respiratory distress.      Breath sounds: Normal breath sounds. No wheezing.   Musculoskeletal:      Cervical back: Neck supple.   Neurological:      Mental Status: She is alert.   Psychiatric:         Behavior: Behavior normal.         Thought Content: Thought content normal.         Judgment: Judgment normal.

## 2024-10-01 ENCOUNTER — TELEPHONE (OUTPATIENT)
Dept: HEPATOLOGY | Facility: CLINIC | Age: 64
End: 2024-10-01
Payer: COMMERCIAL

## 2024-10-01 NOTE — TELEPHONE ENCOUNTER
Pt was called and message was given per provider.    Soila    ----- Message from Caitie Meyers NP sent at 10/1/2024  9:26 AM CDT -----  Contact: pt  This is the first call I am seeing from her. Please let her know that labs confirm she has immunity or protection against hepatitis A. This is good. She does NOT have hepatitis A. Nothing to worry about or do for this. Thanks.  ----- Message -----  From: Soila Brady MA  Sent: 10/1/2024   9:18 AM CDT  To: Caitie Meyers NP      ----- Message -----  From: Maribel Gaffney  Sent: 10/1/2024   8:27 AM CDT  To: [facility]    Type: Needs Medical Advice         Who Called:PT  Best Call Back Number:[phone]  Additional Information: Requesting a call back regarding  Pt asking about labs.  Pt said Hep A came back Pos and she is worried and asking for the office to call her asap. Pt said she has called a few times already. Pt is working and asking for office to call her on her lunch break. 11:45-12:45  Please Advise- Thank you

## 2024-10-01 NOTE — TELEPHONE ENCOUNTER
Message was forward to provider.    Soila    ----- Message from Maribel sent at 10/1/2024  8:25 AM CDT -----  Contact: pt  Type: Needs Medical Advice         Who Called:PT  Best Call Back Number:[phone]  Additional Information: Requesting a call back regarding  Pt asking about labs.  Pt said Hep A came back Pos and she is worried and asking for the office to call her asap. Pt said she has called a few times already. Pt is working and asking for office to call her on her lunch break. 11:45-12:45  Please Advise- Thank you

## 2024-10-01 NOTE — TELEPHONE ENCOUNTER
Spoke with the patient, the hepatitis A IGG was positive and hepatitis-B was negative.  The patient will need the hepatitis-B vaccine, she will discuss this with the hepatologist.  Thank you.

## 2024-10-01 NOTE — TELEPHONE ENCOUNTER
states: Hello.  The LDL levels were elevated, but based on the ASCVD risk which assess the risk for cardiovascular events, she does not need to be on cholesterol medication.  I will recommend healthy habits and continue exercising.  I will recheck at her next office visit.  Let me know if she has any questions.  I sent a message to the portal under blood work. Thank you.      Called pt and spoke to her. She is very upset and crying. She was wanting to know about HEP A. She is upset and doesn't understand why or how.     She is awaiting a call back from the provider whom ordered it but is very upset and would like to know if you know anything. Please advise

## 2024-11-06 ENCOUNTER — PROCEDURE VISIT (OUTPATIENT)
Facility: CLINIC | Age: 64
End: 2024-11-06
Payer: COMMERCIAL

## 2024-11-06 ENCOUNTER — HOSPITAL ENCOUNTER (OUTPATIENT)
Dept: RADIOLOGY | Facility: HOSPITAL | Age: 64
Discharge: HOME | End: 2024-11-06
Attending: FAMILY MEDICINE
Payer: COMMERCIAL

## 2024-11-06 ENCOUNTER — PATIENT MESSAGE (OUTPATIENT)
Facility: CLINIC | Age: 64
End: 2024-11-06

## 2024-11-06 DIAGNOSIS — Z78.0 POST-MENOPAUSAL: ICD-10-CM

## 2024-11-06 DIAGNOSIS — K76.0 METABOLIC DYSFUNCTION-ASSOCIATED STEATOTIC LIVER DISEASE (MASLD): ICD-10-CM

## 2024-11-06 PROCEDURE — 77080 DXA BONE DENSITY AXIAL: CPT | Mod: TC,PO

## 2024-11-06 PROCEDURE — 77080 DXA BONE DENSITY AXIAL: CPT | Mod: 26,,, | Performed by: RADIOLOGY

## 2024-11-06 PROCEDURE — 77092 TBS I&R FX RSK QHP: CPT | Mod: ,,, | Performed by: RADIOLOGY

## 2024-11-06 NOTE — PROCEDURES
FibroScan Transplant Hepatology(Vibration Controlled Transient Elastography)    Date/Time: 11/6/2024 8:30 AM    Performed by: Caitie Meyers NP  Authorized by: Caitie Meyers NP    Diagnosis:  NAFLD    Probe:  XL    Universal Protocol: Patient's identity, procedure and site were verified, confirmatory pause was performed.  Discussed procedure including risks and potential complications.  Questions answered.  Patient verbalizes understanding and wishes to proceed with VCTE.     Procedure: After providing explanations of the procedure, patient was placed in the supine position with right arm in maximum abduction to allow optimal exposure of right lateral abdomen.  Patient was briefly assessed, Testing was performed in the mid-axillary location, 50Hz Shear Wave pulses were applied and the resulting Shear Wave and Propagation Speed detected with a 3.5 MHz ultrasonic signal, using the FibroScan probe, Skin to liver capsule distance and liver parenchyma were accessed during the entire examination with the FibroScan probe, Patient was instructed to breathe normally and to abstain from sudden movements during the procedure, allowing for random measurements of liver stiffness. At least 10 Shear Waves were produced, Individual measurements of each Shear Wave were calculated.  Patient tolerated the procedure well with no complications.  Meets discharge criteria as was dismissed.  Rates pain 0 out of 10.  Patient will follow up with ordering provider to review results.    Findings  Median liver stiffness score:  3.9  CAP Reading: dB/m:  249    IQR/med %:  13  Interpretation  Fibrosis interpretation is based on medial liver stiffness - Kilopascal (kPa).    Fibrosis Stage:  F 0-1  Steatosis interpretation is based on controlled attenuation parameter - (dB/m).    Steatosis Grade:  <S1

## 2025-01-09 ENCOUNTER — OFFICE VISIT (OUTPATIENT)
Facility: CLINIC | Age: 65
End: 2025-01-09
Payer: COMMERCIAL

## 2025-01-09 ENCOUNTER — TELEPHONE (OUTPATIENT)
Facility: CLINIC | Age: 65
End: 2025-01-09

## 2025-01-09 VITALS — WEIGHT: 166 LBS | HEIGHT: 66 IN | BODY MASS INDEX: 26.68 KG/M2

## 2025-01-09 DIAGNOSIS — E78.49 OTHER HYPERLIPIDEMIA: ICD-10-CM

## 2025-01-09 DIAGNOSIS — K76.0 METABOLIC DYSFUNCTION-ASSOCIATED STEATOTIC LIVER DISEASE (MASLD): Primary | ICD-10-CM

## 2025-01-09 PROCEDURE — 99214 OFFICE O/P EST MOD 30 MIN: CPT | Mod: S$GLB,,, | Performed by: NURSE PRACTITIONER

## 2025-01-09 PROCEDURE — 99999 PR PBB SHADOW E&M-EST. PATIENT-LVL III: CPT | Mod: PBBFAC,,, | Performed by: NURSE PRACTITIONER

## 2025-01-09 PROCEDURE — 3008F BODY MASS INDEX DOCD: CPT | Mod: CPTII,S$GLB,, | Performed by: NURSE PRACTITIONER

## 2025-01-09 PROCEDURE — 1159F MED LIST DOCD IN RCRD: CPT | Mod: CPTII,S$GLB,, | Performed by: NURSE PRACTITIONER

## 2025-01-09 NOTE — TELEPHONE ENCOUNTER
Recall for 2 years Hepatology follow up with US and Fibroscan placed in system.    Soila    ---- Message from Caitie Meyers NP sent at 1/9/2025  3:00 PM CST -----  Please enter recall for f/u visit in 2 years with US and Fibroscan

## 2025-01-09 NOTE — PROGRESS NOTES
[facility] - Established Patient    Last Clinic Visit: 9/19/24    Chief Complaint: Follow-up for fatty liver       HISTORY     This is a 64 y.o. female with PMH noted below, here for follow-up of MASLD.     Imaging has shown hepatic steatosis.  No imaging findings to suggest advanced liver disease.     Risk factors for MASLD include:   BMI 35  HLD    Alcohol use: special occasions, holidays. No h/o heavy alcohol use.     She has had 1 mild elevation in her ALT; liver enzymes otherwise normal. Liver function and platelet count are normal.    Serologic workup has been negative for hemochromatosis and viral hepatitis.    Fibrosis staging:   Fibroscan 11/2024 = F0-F1, <S1     Interval history:  Feels well today, no concerns from liver standpoint.   No symptoms of hepatic decompensation including jaundice, ascites, cognitive problems to suggest hepatic encephalopathy, or GI bleeding.     Updates on risk factors for MASLD:  Weight -- Body mass index is 26.79 kg/m².    She has lost another 20 lb over the last year (down 56 lb from max weight)  Continues to exercise; diet high in fruits/vegetables                      Cholesterol -- LDL >160 last year, not on treatment                               Insulin resistance / diabetes -- no   Lab Results   Component Value Date    HGBA1C 5.1 02/03/2023          Alcohol use: minimal     Liver enzymes: WNL last year      Past medical history, surgical history, problem list, family history, social history, allergies: Reviewed and updated in the appropriate section of the electronic medical record.      Current Outpatient Medications   Medication Sig Dispense Refill    hepatitis B vacc-CpG 1018, PF, (HEPLISAV-B, PF,) 20 mcg/0.5 mL Syrg Inject 0.5 mLs (20 mcg total) into the muscle now, repeat in 28 days 0.5 mL 1    omega-3 fatty acids/fish oil (FISH OIL-OMEGA-3 FATTY ACIDS) 300-1,000 mg capsule Take by mouth once daily.       No current facility-administered medications  "for this visit.     Medication list reviewed and updated.      Review of Systems - as per HPI  Constitutional: Negative for unexpected weight change.   Respiratory: Negative for shortness of breath.    Cardiovascular: Negative for leg swelling.  Gastrointestinal: Negative for abdominal distention or abdominal pain. Negative for melena or hematemesis.  Musculoskeletal: Negative for myalgias.    Skin: Negative for jaundice or itching.  Neurological: Negative for confusion or slowed mentation. Negative for tremors.   Hematological: Does not bruise/bleed easily.       Physical Exam   Constitutional: No distress. Alert and oriented.  Eyes: No scleral icterus.   Pulmonary/Chest: Respiratory effort normal. No respiratory distress.   Abdominal: No distension, no ascites appreciated.   Extremities: No edema.   Neurological: No tremor.   Skin: No jaundice.   Psychiatric: Normal mood and affect. Speech, behavior, and thought content normal.       LABS & DIAGNOSTIC STUDIES     I have personally reviewed pertinent laboratory findings:    Lab Results   Component Value Date    ALT 23 06/19/2024    AST 20 06/19/2024    ALKPHOS 57 06/19/2024    BILITOT 0.3 06/19/2024    ALBUMIN 3.7 06/19/2024       Lab Results   Component Value Date    WBC 5.34 03/24/2023    HGB 13.8 03/24/2023    HCT 41.7 03/24/2023    MCV 91 03/24/2023     03/24/2023       Lab Results   Component Value Date     06/19/2024    K 3.8 06/19/2024    BUN 13 06/19/2024    CREATININE 0.8 06/19/2024    ESTGFRAFRICA >60.0 02/21/2022    EGFRNONAA >60.0 02/21/2022       Lab Results   Component Value Date    FERRITIN 126 03/24/2023    FESATURATED 29 03/24/2023    HEPBSAG Non-reactive 09/26/2024    HEPBCAB Non-reactive 09/26/2024    HEPCAB Negative 06/05/2019       No results found for: "AFP"      I have personally reviewed the following result reports:  Abdominal US - 6/19/24    ASSESSMENT & PLAN     64 y.o. female with:    1. Metabolic dysfunction-associated " steatotic liver disease (MASLD)    2. Other hyperlipidemia        Fibroscan did not show significant steatosis but also reassuring, no-minimal fibrosis (F0-F1). She has lost a significant amount of weight and likely reversed this to some degree.      Recommendations:   She prefers to continue monitoring out of caution. Repeat Fibroscan and US in 2 years  Labs to monitor liver enzymes/LFTs every 6-12 months, which can include labs with PCP  Commended on weight loss success with dietary changes and exercise, encouraged to continue this.   Maintain good control of blood pressure, cholesterol, and blood sugar. Will update lipid panel with PCP soon.   Recommend vaccination for hepatitis B       Return to clinic in 2 years with US and Fibroscan.       Thank you for allowing me to participate in the care of Zahida Alemanminerva Meyers, [facility]  Hepatology        Duration of encounter: 30 min  This includes face to face time and non-face to face time preparing to see the patient (eg, review of tests), obtaining and/or reviewing separately obtained history, documenting clinical information in the electronic or other health record, independently interpreting results and communicating results to the patient/family/caregiver, or Care coordination.

## 2025-01-09 NOTE — PATIENT INSTRUCTIONS
Repeat ultrasound and Fibroscan in 2 years  Congrats on the weight loss success- keep up the good work!   Complete hepatitis B vaccines

## 2025-03-17 ENCOUNTER — OFFICE VISIT (OUTPATIENT)
Dept: FAMILY MEDICINE | Facility: CLINIC | Age: 65
End: 2025-03-17
Payer: COMMERCIAL

## 2025-03-17 ENCOUNTER — HOSPITAL ENCOUNTER (OUTPATIENT)
Dept: RADIOLOGY | Facility: HOSPITAL | Age: 65
Discharge: HOME | End: 2025-03-17
Attending: FAMILY MEDICINE
Payer: COMMERCIAL

## 2025-03-17 ENCOUNTER — PATIENT MESSAGE (OUTPATIENT)
Dept: FAMILY MEDICINE | Facility: CLINIC | Age: 65
End: 2025-03-17

## 2025-03-17 ENCOUNTER — RESULTS FOLLOW-UP (OUTPATIENT)
Dept: FAMILY MEDICINE | Facility: CLINIC | Age: 65
End: 2025-03-17

## 2025-03-17 VITALS
HEART RATE: 83 BPM | SYSTOLIC BLOOD PRESSURE: 120 MMHG | DIASTOLIC BLOOD PRESSURE: 60 MMHG | WEIGHT: 164 LBS | BODY MASS INDEX: 26.36 KG/M2 | OXYGEN SATURATION: 99 % | HEIGHT: 66 IN

## 2025-03-17 DIAGNOSIS — Z12.31 ENCOUNTER FOR SCREENING MAMMOGRAM FOR MALIGNANT NEOPLASM OF BREAST: ICD-10-CM

## 2025-03-17 DIAGNOSIS — K76.0 FATTY LIVER DISEASE, NONALCOHOLIC: ICD-10-CM

## 2025-03-17 DIAGNOSIS — Z00.01 ANNUAL VISIT FOR GENERAL ADULT MEDICAL EXAMINATION WITH ABNORMAL FINDINGS: Primary | ICD-10-CM

## 2025-03-17 DIAGNOSIS — R07.81 RIB PAIN: ICD-10-CM

## 2025-03-17 DIAGNOSIS — M25.511 CHRONIC RIGHT SHOULDER PAIN: ICD-10-CM

## 2025-03-17 DIAGNOSIS — G89.29 CHRONIC RIGHT SHOULDER PAIN: ICD-10-CM

## 2025-03-17 DIAGNOSIS — R20.0 NUMBNESS: ICD-10-CM

## 2025-03-17 DIAGNOSIS — E78.49 OTHER HYPERLIPIDEMIA: ICD-10-CM

## 2025-03-17 PROCEDURE — 77063 BREAST TOMOSYNTHESIS BI: CPT | Mod: TC,PO

## 2025-03-17 PROCEDURE — 3078F DIAST BP <80 MM HG: CPT | Mod: CPTII,S$GLB,, | Performed by: FAMILY MEDICINE

## 2025-03-17 PROCEDURE — 71100 X-RAY EXAM RIBS UNI 2 VIEWS: CPT | Mod: TC,FY,PO,LT

## 2025-03-17 PROCEDURE — 99999 PR PBB SHADOW E&M-EST. PATIENT-LVL IV: CPT | Mod: PBBFAC,,, | Performed by: FAMILY MEDICINE

## 2025-03-17 PROCEDURE — 3008F BODY MASS INDEX DOCD: CPT | Mod: CPTII,S$GLB,, | Performed by: FAMILY MEDICINE

## 2025-03-17 PROCEDURE — 71100 X-RAY EXAM RIBS UNI 2 VIEWS: CPT | Mod: 26,LT,, | Performed by: RADIOLOGY

## 2025-03-17 PROCEDURE — 3074F SYST BP LT 130 MM HG: CPT | Mod: CPTII,S$GLB,, | Performed by: FAMILY MEDICINE

## 2025-03-17 PROCEDURE — 77063 BREAST TOMOSYNTHESIS BI: CPT | Mod: 26,,, | Performed by: RADIOLOGY

## 2025-03-17 PROCEDURE — 1160F RVW MEDS BY RX/DR IN RCRD: CPT | Mod: CPTII,S$GLB,, | Performed by: FAMILY MEDICINE

## 2025-03-17 PROCEDURE — 77067 SCR MAMMO BI INCL CAD: CPT | Mod: 26,,, | Performed by: RADIOLOGY

## 2025-03-17 PROCEDURE — 99396 PREV VISIT EST AGE 40-64: CPT | Mod: S$GLB,,, | Performed by: FAMILY MEDICINE

## 2025-03-17 PROCEDURE — 1159F MED LIST DOCD IN RCRD: CPT | Mod: CPTII,S$GLB,, | Performed by: FAMILY MEDICINE

## 2025-03-17 RX ORDER — MULTIVITAMIN WITH IRON
1 TABLET ORAL DAILY
COMMUNITY
Start: 2025-03-17

## 2025-03-17 RX ORDER — MECOBAL/LEVOMEFOLAT CA/B6 PHOS 2-3-35 MG
1 CAPSULE ORAL 2 TIMES DAILY
Qty: 180 CAPSULE | Refills: 3 | Status: SHIPPED | OUTPATIENT
Start: 2025-03-17

## 2025-03-17 RX ORDER — MECOBAL/LEVOMEFOLAT CA/B6 PHOS 2-3-35 MG
1 CAPSULE ORAL 2 TIMES DAILY
COMMUNITY
End: 2025-03-17 | Stop reason: SDUPTHER

## 2025-03-17 NOTE — PROGRESS NOTES
Assessment:       1. Annual visit for general adult medical examination with abnormal findings    2. Other hyperlipidemia    3. Numbness    4. Fatty liver disease, nonalcoholic    5. Rib pain    6. Encounter for screening mammogram for malignant neoplasm of breast    7. Chronic right shoulder pain        Plan:          Annual visit for general adult medical examination with abnormal findings  -     Comprehensive Metabolic Panel; Future; Expected date: 03/17/2025  -     Lipid Panel; Future; Expected date: 03/17/2025  -     Vitamin B12; Future; Expected date: 03/17/2025  -     Folate; Future; Expected date: 03/17/2025  -     CBC Without Differential; Future; Expected date: 03/17/2025  -     TSH; Future; Expected date: 03/17/2025    Other hyperlipidemia:  Uncontrolled  -     omega-3 fatty acids/fish oil (FISH OIL-OMEGA-3 FATTY ACIDS) 300-1,000 mg capsule; Take 1 capsule by mouth once daily.  -     Comprehensive Metabolic Panel; Future; Expected date: 03/17/2025  -     Lipid Panel; Future; Expected date: 03/17/2025    Numbness: Stable  -     mecobal-levomefolat Ca-B6 phos (METANX FC) 2-3-35 mg Cap; Take 1 capsule by mouth 2 (two) times a day.  Dispense: 180 capsule; Refill: 3  -     Vitamin B12; Future; Expected date: 03/17/2025  -     Folate; Future; Expected date: 03/17/2025    Fatty liver disease, nonalcoholic: Stable  -     Comprehensive Metabolic Panel; Future; Expected date: 03/17/2025  -     CBC Without Differential; Future; Expected date: 03/17/2025    Rib pain: New problem workup needed  -     X-Ray Ribs 2 View Left; Future; Expected date: 03/17/2025    Encounter for screening mammogram for malignant neoplasm of breast  -     Mammo Digital Screening Bilat w/ Kole (XPD); Future; Expected date: 03/17/2025    Chronic right shoulder pain:  Worsening       If the symptoms of the right shoulder pain get worse, the patient will contact us so we can order physical therapy.  X-rays on the risks were ordered secondary to  the patient falling more than 3 months ago and still having pain on that area.  Will send a message when we have the results of the test, mammogram was ordered.   Patient agreed with assessment and plan. Patient verbalized understanding.     Subjective:       Patient ID: Zahida Dodd is a 65 y.o. female.    Chief Complaint: Annual Exam    HPI    The patient is coming here today for her annual examination, the patient will need a prescription for medicines, she is doing well with this medication, she also is been exercising and trying eating healthy.  The patient has fatty liver disease, the last liver enzymes were in good range, more than 3 months ago, the patient fell down and hurt her left rib area and left abdominal area and still hurting on the side of the left rib, she also complains of chronic pain on the right shoulder and burning sensation in the middle of the back.  She is due for mammogram she would like this to be ordered.  The last cholesterol levels were elevated, the LDL was 163 and the total cholesterol was 256.    Past medical history, past social history was reviewed and discussed with the patient.    Review of Systems    Objective:      Physical Exam  Vitals and nursing note reviewed.   Constitutional:       General: She is not in acute distress.     Appearance: Normal appearance. She is well-developed. She is not diaphoretic.   HENT:      Head: Normocephalic and atraumatic.      Right Ear: External ear normal.      Left Ear: External ear normal.      Nose: Nose normal.      Mouth/Throat:      Pharynx: No oropharyngeal exudate.   Eyes:      General: No scleral icterus.        Right eye: No discharge.         Left eye: No discharge.      Conjunctiva/sclera: Conjunctivae normal.      Pupils: Pupils are equal, round, and reactive to light.   Cardiovascular:      Rate and Rhythm: Normal rate and regular rhythm.      Heart sounds: Normal heart sounds.   Pulmonary:      Effort: Pulmonary effort  is normal. No respiratory distress.      Breath sounds: Normal breath sounds. No wheezing.   Abdominal:      General: Abdomen is flat. Bowel sounds are normal. There is no distension.      Tenderness: There is no abdominal tenderness.   Musculoskeletal:      Cervical back: Neck supple.   Skin:     Coloration: Skin is not pale.   Neurological:      Mental Status: She is alert.   Psychiatric:         Behavior: Behavior normal.         Thought Content: Thought content normal.         Judgment: Judgment normal.